# Patient Record
Sex: MALE | Race: BLACK OR AFRICAN AMERICAN
[De-identification: names, ages, dates, MRNs, and addresses within clinical notes are randomized per-mention and may not be internally consistent; named-entity substitution may affect disease eponyms.]

---

## 2020-10-10 NOTE — EDM.PDOC
ED HPI GENERAL MEDICAL PROBLEM





- General


Chief Complaint: Respiratory Problem


Stated Complaint: COVID SYMPTOMS


Time Seen by Provider: 10/10/20 18:25


Source of Information: Reports: Patient


History Limitations: Reports: No Limitations





- History of Present Illness


INITIAL COMMENTS - FREE TEXT/NARRATIVE: 





29-year-old male who reports onset of cough and some mild sore throat on 9/29 /2020. He reports that it seemed to be associated with him starting working at 

the [employer] with a lot of [employer] environment. His symptoms however continued 

even when he was at work and they have progressively worsened with time. Ears 

associated with this. No nausea or vomiting. He has had some intermittent 

headache but has no headache now. He denies any pain at present. He would rate 

his pain as a 0/10 at present. He was seen at [facility] in [place] on 

10/6/2020. He was given prescriptions for an inhaler and antibiotics and they 

were wanting to do a chest x-ray but the x-ray person darting left. The patient 

reports that he has been unable to fill the prescription because he does not 

have the funds to do that. In the interim, his feeling of shortness of breath 

and cough have worsened with time. The cough is a hacking cough and 

nonproductive. He reports that the symptoms are worse at night and he feels that

he coughs all night and he coughs to the point of 4 he also passes out. No leg 

swelling. No chest pain. No palpitations. He reports that he has been eating and

drinking normally. Nothing really seems to make his symptoms better. He does 

report that his sense of taste seems to be somewhat off today but he has normal 

sensation of smell. There are no other associated signs or symptoms. There are 

no other modifying factors.


Onset: Other (9/29/2020)


Duration: Getting Worse


Location: Reports: Other (Not applicable.)


Quality: Reports: Other (Nonapplicable.)


Improves with: Reports: Rest


Worsens with: Reports: Breathing, Other (Cough)


Context: Reports: Other


Associated Symptoms: Reports: No Other Symptoms


Treatments PTA: Reports: Other (see below)


  ** Bilateral Trunk


Pain Score (Numeric/FACES): 4





- Related Data


                                    Allergies











Allergy/AdvReac Type Severity Reaction Status Date / Time


 


No Known Allergies Allergy   Verified 10/10/20 18:04











Home Meds: 


                                    Home Meds





predniSONE [Prednisone] 60 mg PO QAM 5 Days #15 tablet 10/10/20 [Rx]











Past Medical History


Psychiatric History: Reports: Bipolar





- Past Surgical History


Other Surgical History Comment: No previous surgeries.





Social & Family History





- Tobacco Use


Smoking Status *Q: Current Every Day Smoker





- Alcohol Use


Alcohol Use History: Yes


Alcohol Use Frequency: Weekly (Drinks alcohol approximately 3 times a week.)





- Recreational Drug Use


Recreational Drug Use: No





- Living Situation & Occupation


Occupation: Employed





ED ROS GENERAL





- Review of Systems


Review Of Systems: See Below


Constitutional: Reports: Fatigue


HEENT: Reports: Throat Pain


Respiratory: Reports: Shortness of Breath, Cough.  Denies: Sputum


Cardiovascular: Reports: Lightheadedness (With coughing.)


GI/Abdominal: Reports: No Symptoms


: Reports: No Symptoms


Musculoskeletal: Reports: No Symptoms


Skin: Reports: No Symptoms


Neurological: Reports: Other (Near-syncope with coughing.)


Psychiatric: Reports: No Symptoms


Hematologic/Lymphatic: Reports: No Symptoms


Immunologic: Reports: No Symptoms





ED EXAM, GENERAL





- Physical Exam


Exam: See Below


Exam Limited By: No Limitations


General Appearance: Alert, WD/WN, No Apparent Distress


Eye Exam: Bilateral Eye: EOMI, Normal Inspection


Ears: Normal External Exam, Hearing Grossly Normal


Ear Exam: Bilateral Ear: Auricle Normal


Nose: Normal Inspection, Normal Mucosa, No Blood


Throat/Mouth: Normal Oropharynx, Normal Voice, No Airway Compromise


Head: Atraumatic, Normocephalic


Neck: Normal Inspection, Supple, Non-Tender, Full Range of Motion


Respiratory/Chest: No Respiratory Distress, No Accessory Muscle Use, Wheezing 

(Bilaterally with decreased air movement.), Prolonged Expiration.  No: Stridor


Cardiovascular: Normal Peripheral Pulses, Regular Rate, Rhythm, No Murmur, No 

Rub


Peripheral Pulses: 2+: Radial (L), Radial (R)


GI/Abdominal: Normal Bowel Sounds, Soft, Non-Tender, No Mass


Back Exam: Normal Inspection


Extremities: Normal Inspection, Normal Range of Motion, Non-Tender, No Pedal 

Edema, Normal Capillary Refill


Neurological: Alert, Oriented, CN II-XII Intact, Normal Cognition, No 

Motor/Sensory Deficits


Skin Exam: Warm, Dry, Intact, Normal Color, No Rash





Course





- Vital Signs


Last Recorded V/S: 


                                Last Vital Signs











Temp  36.8 C   10/10/20 18:05


 


Pulse  100   10/10/20 18:05


 


Resp  18   10/10/20 18:05


 


BP  136/92 H  10/10/20 18:09


 


Pulse Ox  98   10/10/20 18:05














- Orders/Labs/Meds


Orders: 


                               Active Orders 24 hr











 Category Date Time Status


 


 Chest 2V [CR] Stat Exams  10/10/20 20:50 Taken











Labs: 


                                Laboratory Tests











  10/10/20 Range/Units





  19:05 


 


SARS-CoV-2 RNA (JOE)  Negative  (NEGATIVE)  











Meds: 


Medications














Discontinued Medications














Generic Name Dose Route Start Last Admin





  Trade Name Blair  PRN Reason Stop Dose Admin


 


Albuterol  1 gm  10/10/20 20:51  10/10/20 21:15





  Ventolin Hfa  INH  10/10/20 20:52  4 inhaler





  ONETIME ONE   Administration


 


Prednisone  60 mg  10/10/20 20:51  10/10/20 21:15





  Prednisone  PO  10/10/20 20:52  60 mg





  ONETIME ONE   Administration














- Radiology Interpretation


Free Text/Narrative:: 





Chest x-ray PA and lateral shows no acute disease.





- Re-Assessments/Exams


Free Text/Narrative Re-Assessment/Exam: 





10/10/20 21:38: Patient's chest x-ray shows no acute disease. He has remained 

vitally stable with normal O2 saturations. He was given an albuterol inhaler, 4 

puffs approximately 15-20 minutes ago and he is feeling improved after this. He 

was also given prednisone 60 mg by mouth. A rapid Covid test was negative. He 

appears to be having bronchitis with reactive airway disease. It appears that 

there could be an allergic component as he has been having symptoms since he 

began working at the [employer] that he is currently working. We will send 

the inhaler home with him that he can use 2-4 puffs every 4 hours as needed for 

cough and difficulty breathing. I will also place patient on Zithromax for 5 day

 course and prednisone 60 mg daily for the next 5 days. He should increase his 

fluid intake. I have strongly encouraged him to stop smoking cigarettes. 

Precautions and reasons for return to the emergency department were discussed 

with the patient will he was in the emergency department over detailed in the 

patient's discharge instructions.








Departure





- Departure


Time of Disposition: 21:45


Disposition: Home, Self-Care 01


Condition: Good


Clinical Impression: 


 Bronchitis





Reactive airway disease


Qualifiers:


 Asthma severity: moderate Asthma persistence: persistent Asthma complication 

type: uncomplicated Qualified Code(s): J45.40 - Moderate persistent asthma, 

uncomplicated








- Discharge Information


Prescriptions: 


predniSONE [Prednisone] 60 mg PO QAM 5 Days #15 tablet


Instructions:  Bronchospasm, Adult, How to Use a Metered Dose Inhaler, Acute 

Bronchitis, Adult, Easy-to-Read


Referrals: 


Jose Alejandro Davison MD [Primary Care Provider] - 


Forms:  ED Department Discharge, ED Return to Work/School Form


Additional Instructions: 


A Covid test was negative. Your chest x-ray showed no evidence of pneumonia. You

did have quite a bit of wheezing or bronchospasm (constriction of your air 

passages) and appeared to have an asthma-like condition called reactive airway 

disease which could be related to an infection but could also be related to the 

dust in your work environment. No work for the next 2 days. Use the albuterol 

inhaler with the spacer that we provided you for wheezing and for cough and for 

difficulty breathing. Increase your fluid intake. Other medications as 

prescribed (Zithromax, prednisone). Back to the emergency department for worse 

breathing, high fever, severe weakness or any other concerning sign or symptom.





Sepsis Event Note (ED)





- Evaluation


Sepsis Screening Result: No Definite Risk





- Focused Exam


Vital Signs: 


                                   Vital Signs











  Temp Pulse Resp BP Pulse Ox


 


 10/10/20 18:09     136/92 H 


 


 10/10/20 18:05  36.8 C  100  18  157/104 H  98














- My Orders


Last 24 Hours: 


My Active Orders





10/10/20 20:50


Chest 2V [CR] Stat 














- Assessment/Plan


Last 24 Hours: 


My Active Orders





10/10/20 20:50


Chest 2V [CR] Stat

## 2021-01-18 NOTE — CR
INDICATION:  Left upper chest pain. 



CHEST, TWO VIEWS:  PA and lateral views of the chest 01/18/21 were compared with
10/10/20 and revealed the heart to remain normal in size and shape.  Mediastinum
and bony thorax were essentially unremarkable except to note a very minimal 
dextroconvex scoliosis of the mid thoracic spine.  



Overlying EKG leads are noted.  



A definite active infiltrate or effusion was not identified with markings 
similar to the previous examination.  



IMPRESSION:  No definite active disease. 



MTDD

## 2021-01-18 NOTE — EDM.PDOC
ED HPI GENERAL MEDICAL PROBLEM





- General


Chief Complaint: Chest Pain


Stated Complaint: CHEST PAIN


Time Seen by Provider: 01/18/21 18:10


Source of Information: Reports: Patient


History Limitations: Reports: No Limitations





- History of Present Illness


INITIAL COMMENTS - FREE TEXT/NARRATIVE: 





c/o L sided CP





pt has been having sharp in his L upper chest for 1m, will last several hours to

all day





no increase with DB, lifting, putting arms overhead





no decrease with heat





not taken any meds for it





says he used IV drugs in past, which has made him worried re his heart





has taken BP med x 1y





no PCP as moved to area, working, got his health card one month ago





has infant child at home





has been using an HFA for past 2m, daily, altho not clear he has been wheezing





denies h/o asthma, no exercise induced asthma


  ** left chest


Pain Score (Numeric/FACES): 6





- Related Data


                                    Allergies











Allergy/AdvReac Type Severity Reaction Status Date / Time


 


No Known Allergies Allergy   Verified 10/10/20 18:04











Home Meds: 


                                    Home Meds





FLUoxetine [PROzac] 20 mg PO DAILY 01/18/21 [History]


cloNIDine [Catapres] 0.1 mg PO BID 01/18/21 [History]











Past Medical History


Cardiovascular History: Reports: Hypertension


Neurological History: Reports: Seizure


Psychiatric History: Reports: Bipolar, PTSD





- Past Surgical History


Other Surgical History Comment: No previous surgeries.





Social & Family History





- Tobacco Use


Tobacco Use Status *Q: Current Every Day Tobacco User


Years of Tobacco use: 16


Packs/Tins Daily: 0.5





- Living Situation & Occupation


Occupation: Employed





ED ROS GENERAL





- Review of Systems


Review Of Systems: See Below


Constitutional: Reports: No Symptoms


HEENT: Reports: No Symptoms


Respiratory: Denies: Wheezing, Pleuritic Chest Pain


Cardiovascular: Reports: No Symptoms


Endocrine: Reports: No Symptoms


GI/Abdominal: Reports: No Symptoms


: Reports: No Symptoms


Musculoskeletal: Reports: No Symptoms


Skin: Reports: No Symptoms


Neurological: Reports: No Symptoms


Psychiatric: Reports: No Symptoms


Hematologic/Lymphatic: Reports: No Symptoms


Immunologic: Reports: No Symptoms





ED EXAM, GENERAL





- Physical Exam


Exam: See Below


Exam Limited By: No Limitations


General Appearance: Alert, WD/WN, No Apparent Distress, Other (holding hand on 

his L upper chest (not beter with massage), no point tender, pt wonders if it 

may be stress)


Nose: Normal Inspection


Throat/Mouth: Normal Inspection, Normal Voice


Head: Atraumatic, Normocephalic


Neck: Normal Inspection, Supple, Non-Tender, Full Range of Motion.  No: 

Lymphadenopathy (R), Lymphadenopathy (L)


Respiratory/Chest: No Respiratory Distress, Lungs Clear, Normal Breath Sounds, 

No Accessory Muscle Use, Chest Non-Tender


Cardiovascular: Regular Rate, Rhythm, No Edema, No Gallop, No JVD, No Murmur, No

 Rub


GI/Abdominal: Soft, Non-Tender


Back Exam: Normal Inspection, Full Range of Motion, NT


Extremities: Normal Inspection, Normal Range of Motion, Non-Tender, No Pedal 

Edema


Neurological: Alert, Oriented, CN II-XII Intact, Normal Cognition, Normal Gait, 

No Motor/Sensory Deficits


Psychiatric: Normal Affect, Normal Mood


Skin Exam: Warm, Dry, Intact, Normal Color, No Rash


Lymphatic: No Adenopathy





Course





- Vital Signs


Last Recorded V/S: 





                                Last Vital Signs











Temp  37.0 C   01/18/21 17:29


 


Pulse  100   01/18/21 17:29


 


Resp  19   01/18/21 17:29


 


BP  165/96 H  01/18/21 17:29


 


Pulse Ox  97   01/18/21 17:29














- Orders/Labs/Meds


Orders: 





                               Active Orders 24 hr











 Category Date Time Status


 


 Chest 2V [CR] Stat Exams  01/18/21 18:37 Ordered


 


 C-REACTIVE PROTEIN [CHEM] Stat Lab  01/18/21 18:37 Ordered


 


 CBC WITH AUTO DIFF [HEME] Stat Lab  01/18/21 18:37 Ordered


 


 COMPREHENSIVE METABOLIC PN,CMP [CHEM] Stat Lab  01/18/21 18:37 Ordered


 


 PRO B-TYPE NATRIUR PEPT,BNPPRO [CHEM] Stat Lab  01/18/21 18:37 Ordered


 


 TROPONIN I [CHEM] Stat Lab  01/18/21 18:37 Ordered














Departure





- Departure


Time of Disposition: 19:00


Disposition: Still A Patient 30


Condition: Good


Clinical Impression: 


 Left-sided chest wall pain








Sepsis Event Note (ED)





- Evaluation


Sepsis Screening Result: No Definite Risk





- Focused Exam


Vital Signs: 





                                   Vital Signs











  Temp Pulse Resp BP Pulse Ox


 


 01/18/21 17:29  37.0 C  100  19  165/96 H  97














- My Orders


Last 24 Hours: 





My Active Orders





01/18/21 18:37


Chest 2V [CR] Stat 


C-REACTIVE PROTEIN [CHEM] Stat 


CBC WITH AUTO DIFF [HEME] Stat 


COMPREHENSIVE METABOLIC PN,CMP [CHEM] Stat 


PRO B-TYPE NATRIUR PEPT,BNPPRO [CHEM] Stat 


TROPONIN I [CHEM] Stat 














- Assessment/Plan


Last 24 Hours: 





My Active Orders





01/18/21 18:37


Chest 2V [CR] Stat 


C-REACTIVE PROTEIN [CHEM] Stat 


CBC WITH AUTO DIFF [HEME] Stat 


COMPREHENSIVE METABOLIC PN,CMP [CHEM] Stat 


PRO B-TYPE NATRIUR PEPT,BNPPRO [CHEM] Stat 


TROPONIN I [CHEM] Stat

## 2021-01-23 NOTE — EDM.PDOC
ED HPI GENERAL MEDICAL PROBLEM





- General


Chief Complaint: Abdominal Pain


Stated Complaint: ABDOMINAL PAIN


Time Seen by Provider: 01/23/21 18:20


Source of Information: Reports: Patient


History Limitations: Reports: No Limitations





- History of Present Illness


INITIAL COMMENTS - FREE TEXT/NARRATIVE: 





30-year-old male who reports onset of left lower abdominal pain that is a sharp 

and throbbing type pain 3 days ago. The pain has progressively worsened with 

time and it seems to have radiated to his flank and to his left mid back and 

sometimes seems to go into his left chest. He was actually seen here 3 days ago 

for the chest pain and he reports the pain has not improved. He has also noted 

some nausea with vomiting over the past few days with vomiting times one today 

he also has had diarrhea today 3. There has been no blood in either the emesis 

or the stool but he does report that the stool has been somewhat dark. No fevers

but he has had sweats. No chills. No nasal congestion. No cough. No sore throat.

No dysuria. No hematuria. He does report decreased urine output and he has had 

decreased by mouth intake. There are no other associated signs or symptoms. 

There are no other modifying factors.


Onset: Other


Duration: Getting Worse (3 days ago)


Location: Reports: Chest, Abdomen, Back


Quality: Reports: Sharp, Throbbing


Severity: Moderate (to severe.)


Improves with: Reports: None


Worsens with: Reports: Other (Palpation. He really has not been eating and has 

no appetite.)


Context: Reports: Other (As above.)


Associated Symptoms: Reports: Loss of Appetite, Malaise, Nausea/Vomiting


Treatments PTA: Reports: Other (see below) (Nothing.)





- Related Data


                                    Allergies











Allergy/AdvReac Type Severity Reaction Status Date / Time


 


No Known Allergies Allergy   Verified 10/10/20 18:04











Home Meds: 


                                    Home Meds





FLUoxetine [PROzac] 20 mg PO DAILY 01/18/21 [History]


cloNIDine [Catapres] 0.1 mg PO BID 01/18/21 [History]











Past Medical History


Cardiovascular History: Reports: Hypertension


Respiratory History: Reports: Other (See Below) (Reactive airway disease on 

inhalers.)


Neurological History: Reports: Seizure


Psychiatric History: Reports: Bipolar, PTSD





- Past Surgical History


Other Surgical History Comment: No previous surgeries.





Social & Family History





- Tobacco Use


Tobacco Use Status *Q: Current Every Day Tobacco User





- Alcohol Use


Alcohol Use History: Yes


Alcohol Use Frequency: Weekly (3 times a week)





- Living Situation & Occupation


Occupation: Employed (Works at [employer])





ED ROS GENERAL





- Review of Systems


Review Of Systems: See Below


Constitutional: Reports: Malaise, Night Sweats, Decreased Appetite


HEENT: Reports: No Symptoms


Respiratory: Reports: No Symptoms


Cardiovascular: Reports: No Symptoms


GI/Abdominal: Reports: Abdominal Pain, Diarrhea, Nausea, Vomiting


: Reports: Other (Decreased urine output).  Denies: Dysuria, Hematuria


Musculoskeletal: Reports: No Symptoms


Skin: Reports: No Symptoms


Neurological: Reports: No Symptoms


Hematologic/Lymphatic: Reports: No Symptoms


Immunologic: Reports: No Symptoms





ED EXAM, GI/ABD





- Physical Exam


Exam: See Below


Exam Limited By: No Limitations


General Appearance: Alert, WD/WN, Mild Distress (Seems to be in some pain. He 

does not appear toxic.)


Eyes: Bilateral: Normal Appearance, EOMI


Ears: Normal External Exam, Hearing Grossly Normal


Nose: Normal Inspection, Nasal Deformity, Nasal Swelling, Nasal Drainage


Throat/Mouth: Normal Voice, No Airway Compromise, Other (Dry mucous membranes.)


Head: Atraumatic, Normocephalic


Neck: Normal Inspection, Supple, Non-Tender, Full Range of Motion


Respiratory/Chest: No Respiratory Distress, Lungs Clear, Normal Breath Sounds, 

No Accessory Muscle Use, Chest Non-Tender


Cardiovascular: Normal Peripheral Pulses, Regular Rate, Rhythm, No Murmur


GI/Abdominal Exam: Soft, No Mass, Tender (He left lower quadrant and left 

flank.), Abnormal Bowel Sounds (Bowel sounds are somewhat diminished.)


Back Exam: Full Range of Motion, CVA Tenderness (L)


Extremities: Normal Inspection, Normal Range of Motion, Non-Tender, No Pedal 

Edema, Normal Capillary Refill


Neurological: Alert, Oriented, CN II-XII Intact, Normal Cognition, No 

Motor/Sensory Deficits


Skin Exam: Warm, Dry, Intact, Normal Color, No Rash





Course





- Vital Signs


Last Recorded V/S: 


                                Last Vital Signs











Temp  36.7 C   01/23/21 17:01


 


Pulse  87   01/23/21 17:01


 


Resp  17   01/23/21 17:01


 


BP  157/109 H  01/23/21 17:01


 


Pulse Ox  98   01/23/21 17:01














- Orders/Labs/Meds


Orders: 


                               Active Orders 24 hr











 Category Date Time Status


 


 Abdomen Pelvis w Cont [CT] Stat Exams  01/23/21 19:28 Ordered


 


 Sodium Chloride 0.9% [Normal Saline] 1,000 ml Med  01/23/21 18:45 Active





 IV ASDIRECTED   


 


 Sodium Chloride 0.9% [Saline Flush] Med  01/23/21 18:30 Active





 10 ml FLUSH ASDIRECTED PRN   


 


 Peripheral IV Insertion Adult [OM.PC] Routine Oth  01/23/21 18:30 Ordered








                                Medication Orders





Sodium Chloride (Normal Saline)  1,000 mls @ 150 mls/hr IV ASDIRECTED SALVADOR


   Last Admin: 01/23/21 19:52  Dose: 150 mls/hr


   Documented by: MATIMAR


Sodium Chloride (Saline Flush)  10 ml FLUSH ASDIRECTED PRN


   PRN Reason: Keep Vein Open








Labs: 


                                Laboratory Tests











  01/23/21 01/23/21 01/23/21 Range/Units





  18:30 18:40 18:40 


 


WBC   6.8   (3.2-10.1)  x10-3/uL


 


RBC   4.96   (3.90-5.90)  x10(6)uL


 


Hgb   15.4   (12.9-17.7)  g/dL


 


Hct   45.4   (38.3-50.1)  %


 


MCV   91.6   (80.8-98.7)  fL


 


MCH   31.0   (27.0-33.3)  pg


 


MCHC   33.8   (28.7-35.3)  g/dL


 


RDW   13.7   (12.4-15.0)  %


 


Plt Count   234   (117-477)  x10(3)uL


 


MPV   9.6   (6.7-11.0)  fL


 


Neut % (Auto)   55.3   (40.3-71.8)  %


 


Lymph % (Auto)   27.5   (15.8-45.3)  %


 


Mono % (Auto)   14.0   (5.5-15.2)  %


 


Eos % (Auto)   2.5   (0.1-6.8)  %


 


Baso % (Auto)   0.7   (0.3-3.8)  %


 


Neut # (Auto)   3.7   (1.7-6.9)  x10-3/uL


 


Lymph # (Auto)   1.9   (0.5-4.5)  x10-3/uL


 


Mono # (Auto)   0.9   (0.0-1.2)  x10-3/uL


 


Eos # (Auto)   0.2   (0.0-0.6)  x10-3/uL


 


Baso # (Auto)   0.0   (0.0-0.3)  x10-3/uL


 


Sodium    138  (135-145)  mmol/L


 


Potassium    4.0  (3.5-5.3)  mmol/L


 


Chloride    100  (100-110)  mmol/L


 


Carbon Dioxide    27  (21-32)  mmol/L


 


BUN    7  (7-18)  mg/dL


 


Creatinine    1.1  (0.70-1.30)  mg/dL


 


Est Cr Clr Drug Dosing    TNP  


 


Estimated GFR (MDRD)    > 60  (>60)  


 


BUN/Creatinine Ratio    6.4 L  (9-20)  


 


Glucose    105  ()  mg/dL


 


Calcium    8.4 L  (8.6-10.2)  mg/dL


 


Magnesium    1.4 L  (1.8-2.5)  mg/dL


 


Total Bilirubin    0.7  (0.1-1.3)  mg/dL


 


AST    100 H D  (5-25)  IU/L


 


ALT    164 H* D  (12-36)  U/L


 


Alkaline Phosphatase    116 H  ()  IU/L


 


C-Reactive Protein     (0.5-0.9)  mg/dL


 


Total Protein    8.2 H  (6.0-8.0)  g/dL


 


Albumin    3.9  (3.5-5.2)  g/dL


 


Globulin    4.3  g/dL


 


Albumin/Globulin Ratio    0.9  


 


Lipase     ()  U/L


 


Urine Color  Yellow    (YELLOW)  


 


Urine Appearance  Slightly cloudy    (CLEAR)  


 


Urine pH  6.0    (5.0-6.5)  


 


Ur Specific Gravity  1.020    (1.010-1.025)  


 


Urine Protein  30 H    (NEGATIVE)  mg/dL


 


Urine Glucose (UA)  Normal    (NORMAL)  mg/dL


 


Urine Ketones  15 H    (NEGATIVE)  mg/dL


 


Urine Occult Blood  Negative    (NEGATIVE)  


 


Urine Nitrite  Negative    (NEGATIVE)  


 


Urine Bilirubin  Small H    (NEGATIVE)  


 


Urine Urobilinogen  Normal    (NEGATIVE)  mg/dL


 


Ur Leukocyte Esterase  Negative    (NEGATIVE)  


 


Urine RBC  0-5    (0-5)  


 


Urine WBC  0-5    (0-5)  


 


Ur Squamous Epith Cells  Occasional    (NS,R,O)  


 


Urine Bacteria  Few H    (NS)  


 


Fine Granular Casts  Occasional H    (NS)  


 


Urine Mucus  Many H    (NS)  














  01/23/21 Range/Units





  18:40 


 


WBC   (3.2-10.1)  x10-3/uL


 


RBC   (3.90-5.90)  x10(6)uL


 


Hgb   (12.9-17.7)  g/dL


 


Hct   (38.3-50.1)  %


 


MCV   (80.8-98.7)  fL


 


MCH   (27.0-33.3)  pg


 


MCHC   (28.7-35.3)  g/dL


 


RDW   (12.4-15.0)  %


 


Plt Count   (117-477)  x10(3)uL


 


MPV   (6.7-11.0)  fL


 


Neut % (Auto)   (40.3-71.8)  %


 


Lymph % (Auto)   (15.8-45.3)  %


 


Mono % (Auto)   (5.5-15.2)  %


 


Eos % (Auto)   (0.1-6.8)  %


 


Baso % (Auto)   (0.3-3.8)  %


 


Neut # (Auto)   (1.7-6.9)  x10-3/uL


 


Lymph # (Auto)   (0.5-4.5)  x10-3/uL


 


Mono # (Auto)   (0.0-1.2)  x10-3/uL


 


Eos # (Auto)   (0.0-0.6)  x10-3/uL


 


Baso # (Auto)   (0.0-0.3)  x10-3/uL


 


Sodium   (135-145)  mmol/L


 


Potassium   (3.5-5.3)  mmol/L


 


Chloride   (100-110)  mmol/L


 


Carbon Dioxide   (21-32)  mmol/L


 


BUN   (7-18)  mg/dL


 


Creatinine   (0.70-1.30)  mg/dL


 


Est Cr Clr Drug Dosing   


 


Estimated GFR (MDRD)   (>60)  


 


BUN/Creatinine Ratio   (9-20)  


 


Glucose   ()  mg/dL


 


Calcium   (8.6-10.2)  mg/dL


 


Magnesium   (1.8-2.5)  mg/dL


 


Total Bilirubin   (0.1-1.3)  mg/dL


 


AST   (5-25)  IU/L


 


ALT   (12-36)  U/L


 


Alkaline Phosphatase   ()  IU/L


 


C-Reactive Protein  0.3 L  (0.5-0.9)  mg/dL


 


Total Protein   (6.0-8.0)  g/dL


 


Albumin   (3.5-5.2)  g/dL


 


Globulin   g/dL


 


Albumin/Globulin Ratio   


 


Lipase  105  ()  U/L


 


Urine Color   (YELLOW)  


 


Urine Appearance   (CLEAR)  


 


Urine pH   (5.0-6.5)  


 


Ur Specific Gravity   (1.010-1.025)  


 


Urine Protein   (NEGATIVE)  mg/dL


 


Urine Glucose (UA)   (NORMAL)  mg/dL


 


Urine Ketones   (NEGATIVE)  mg/dL


 


Urine Occult Blood   (NEGATIVE)  


 


Urine Nitrite   (NEGATIVE)  


 


Urine Bilirubin   (NEGATIVE)  


 


Urine Urobilinogen   (NEGATIVE)  mg/dL


 


Ur Leukocyte Esterase   (NEGATIVE)  


 


Urine RBC   (0-5)  


 


Urine WBC   (0-5)  


 


Ur Squamous Epith Cells   (NS,R,O)  


 


Urine Bacteria   (NS)  


 


Fine Granular Casts   (NS)  


 


Urine Mucus   (NS)  











Meds: 


Medications











Generic Name Dose Route Start Last Admin





  Trade Name Freq  PRN Reason Stop Dose Admin


 


Sodium Chloride  1,000 mls @ 150 mls/hr  01/23/21 18:45  01/23/21 19:52





  Normal Saline  IV   150 mls/hr





  ASDIRECTED SALVADOR   Administration


 


Sodium Chloride  10 ml  01/23/21 18:30 





  Saline Flush  FLUSH  





  ASDIRECTED PRN  





  Keep Vein Open  














Discontinued Medications














Generic Name Dose Route Start Last Admin





  Trade Name Freq  PRN Reason Stop Dose Admin


 


Sodium Chloride  1,000 mls @ 999 mls/hr  01/23/21 18:32  01/23/21 18:35





  Normal Saline  IV  01/23/21 19:32  999 mls/hr





  .BOLUS ONE   Administration


 


Iopamidol  100 ml  01/23/21 19:37  01/23/21 19:51





  Isovue-370 (76%)  IV  01/23/21 19:38  100 ml





  .AS DIRECTED ONE   Administration


 


Ketorolac Tromethamine  30 mg  01/23/21 18:32  01/23/21 18:49





  Toradol  IVPUSH  01/23/21 18:33  30 mg





  ONETIME ONE   Administration


 


Ondansetron HCl  4 mg  01/23/21 18:32  01/23/21 18:51





  Zofran  IVPUSH  01/23/21 18:33  4 mg





  ONETIME ONE   Administration














- Re-Assessments/Exams


Free Text/Narrative Re-Assessment/Exam: 





01/23/21 19:30: Care of patient to Dr. Alves. Please see his note regard to 

the patient's further ED course and to the patient's disposition.








Departure





- Departure


Time of Disposition: 19:30


Disposition: Still A Patient 30


Clinical Impression: 


 Vomiting and diarrhea





Abdominal pain


Qualifiers:


 Abdominal location: left lower quadrant Qualified Code(s): R10.32 - Left lower 

quadrant pain








- Discharge Information


Referrals: 


PCP,None [Primary Care Provider] - 


Forms:  ED Department Discharge





Sepsis Event Note (ED)





- Focused Exam


Vital Signs: 


                                   Vital Signs











  Temp Pulse Resp BP Pulse Ox


 


 01/23/21 17:01  36.7 C  87  17  157/109 H  98














- My Orders


Last 24 Hours: 


My Active Orders





01/23/21 18:30


Sodium Chloride 0.9% [Saline Flush]   10 ml FLUSH ASDIRECTED PRN 


Peripheral IV Insertion Adult [OM.PC] Routine 





01/23/21 18:45


Sodium Chloride 0.9% [Normal Saline] 1,000 ml IV ASDIRECTED 














- Assessment/Plan


Last 24 Hours: 


My Active Orders





01/23/21 18:30


Sodium Chloride 0.9% [Saline Flush]   10 ml FLUSH ASDIRECTED PRN 


Peripheral IV Insertion Adult [OM.PC] Routine 





01/23/21 18:45


Sodium Chloride 0.9% [Normal Saline] 1,000 ml IV ASDIRECTED

## 2021-05-27 ENCOUNTER — HOSPITAL ENCOUNTER (INPATIENT)
Dept: HOSPITAL 7 - FB.ED | Age: 31
LOS: 1 days | Discharge: NURSING, REHAB OR LONG-TERM CARE | DRG: 896 | End: 2021-05-28
Attending: FAMILY MEDICINE | Admitting: FAMILY MEDICINE
Payer: MEDICAID

## 2021-05-27 DIAGNOSIS — K21.9: ICD-10-CM

## 2021-05-27 DIAGNOSIS — E66.9: ICD-10-CM

## 2021-05-27 DIAGNOSIS — K76.0: ICD-10-CM

## 2021-05-27 DIAGNOSIS — F19.11: ICD-10-CM

## 2021-05-27 DIAGNOSIS — R10.32: ICD-10-CM

## 2021-05-27 DIAGNOSIS — R18.8: ICD-10-CM

## 2021-05-27 DIAGNOSIS — R56.9: ICD-10-CM

## 2021-05-27 DIAGNOSIS — F20.9: ICD-10-CM

## 2021-05-27 DIAGNOSIS — N17.9: ICD-10-CM

## 2021-05-27 DIAGNOSIS — M54.5: ICD-10-CM

## 2021-05-27 DIAGNOSIS — F10.232: Primary | ICD-10-CM

## 2021-05-27 DIAGNOSIS — F17.200: ICD-10-CM

## 2021-05-27 DIAGNOSIS — K85.90: ICD-10-CM

## 2021-05-27 DIAGNOSIS — Z79.899: ICD-10-CM
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
## 2021-05-27 DIAGNOSIS — Z20.822: ICD-10-CM

## 2021-05-27 DIAGNOSIS — I10: ICD-10-CM

## 2021-05-27 DIAGNOSIS — F43.10: ICD-10-CM

## 2021-05-27 DIAGNOSIS — F31.9: ICD-10-CM

## 2021-05-27 PROCEDURE — C9113 INJ PANTOPRAZOLE SODIUM, VIA: HCPCS

## 2021-05-27 PROCEDURE — U0002 COVID-19 LAB TEST NON-CDC: HCPCS

## 2021-05-27 RX ADMIN — LORAZEPAM SCH MG: 2 INJECTION INTRAMUSCULAR; INTRAVENOUS at 20:59

## 2021-05-27 RX ADMIN — LORAZEPAM SCH MG: 2 INJECTION INTRAMUSCULAR; INTRAVENOUS at 18:44

## 2021-05-27 RX ADMIN — INSULIN LISPRO SCH: 100 INJECTION, SOLUTION INTRAVENOUS; SUBCUTANEOUS at 15:37

## 2021-05-27 RX ADMIN — HYDROMORPHONE HYDROCHLORIDE PRN MG: 2 INJECTION INTRAMUSCULAR; INTRAVENOUS; SUBCUTANEOUS at 17:32

## 2021-05-27 RX ADMIN — HYDROMORPHONE HYDROCHLORIDE PRN MG: 2 INJECTION INTRAMUSCULAR; INTRAVENOUS; SUBCUTANEOUS at 19:45

## 2021-05-27 RX ADMIN — Medication PRN ML: at 21:20

## 2021-05-27 RX ADMIN — INSULIN LISPRO SCH UNIT: 100 INJECTION, SOLUTION INTRAVENOUS; SUBCUTANEOUS at 23:35

## 2021-05-27 RX ADMIN — INSULIN LISPRO SCH: 100 INJECTION, SOLUTION INTRAVENOUS; SUBCUTANEOUS at 18:47

## 2021-05-27 RX ADMIN — INSULIN LISPRO SCH: 100 INJECTION, SOLUTION INTRAVENOUS; SUBCUTANEOUS at 23:14

## 2021-05-27 RX ADMIN — INSULIN LISPRO SCH UNIT: 100 INJECTION, SOLUTION INTRAVENOUS; SUBCUTANEOUS at 17:39

## 2021-05-27 RX ADMIN — KETOROLAC TROMETHAMINE SCH MG: 30 INJECTION, SOLUTION INTRAMUSCULAR at 22:46

## 2021-05-27 NOTE — PCM.HP.2
H&P History of Present Illness





- General


Date of Service: 05/27/21


Admit Problem/Dx: 


                           Admission Diagnosis/Problem





Admission Diagnosis/Problem      Pancreatitis








Source of Information: Patient, Old Records, Provider


History Limitations: Reports: No Limitations





- History of Present Illness


Initial Comments - Free Text/Narative: 


30-year-old gentleman with a past medical history significant for alcohol and 

other substance abuse came to the emergency department for evaluation of acute 

abdominal pain.  He has been to rehabilitation and has not used any substances 

other than alcohol for several years.  However, he does drink alcohol, 6 or more

1 ounce servings of liquor daily.  His last alcohol use was approximately 24 

hours prior to presenting to the emergency department and the patient states he 

had about 5 shots.  He had been having increasing abdominal pain, nausea, and 

reduced appetite for several days.  In the emergency department he was found to 

have acute pancreatitis with elevated liver enzymes and elevated lipase.  CT of 

the abdomen showed likely pancreatitis with likely pseudocyst/phlegmon.  Patient

will be admitted for treatment of acute pancreatitis.





Onset of Symptoms: Reports: Gradual


Duration of Symptoms: Reports: Day(s):


Location: Reports: Abdomen


  ** ABDOMINAL


Pain Score (Numeric/FACES): 10





- Related Data


Allergies/Adverse Reactions: 


                                    Allergies











Allergy/AdvReac Type Severity Reaction Status Date / Time


 


No Known Allergies Allergy   Unverified 05/27/21 11:57











Home Medications: 


                                    Home Meds





cloNIDine [Catapres] 0.05 mg PO DAILY 01/18/21 [History]


Albuterol Sulfate [Albuterol Sulfate Hfa] 2 puff INH Q4H PRN 05/27/21 [History]


Losartan [Cozaar] 50 mg PO DAILY 05/27/21 [History]


Omeprazole 20 mg PO DAILY 05/27/21 [History]











Past Medical History


Cardiovascular History: Reports: Hypertension


Respiratory History: Reports: Other (See Below) (Reactive airway disease on 

inhalers.)


Neurological History: Reports: Seizure


Psychiatric History: Reports: Bipolar, PTSD, Schizophrenia, Suicide Attempt





- Past Surgical History


Other Surgical History Comment: No previous surgeries.





Social & Family History





- Family History


Family Medical History: No Pertinent Family History





- Tobacco Use


Tobacco Use Status *Q: Current Every Day Tobacco User


Years of Tobacco use: 20


Packs/Tins Daily: 0





- Caffeine Use


Caffeine Use: Reports: Coffee





- Alcohol Use


Days Per Week of Alcohol Use: 7


Number of Drinks Per Day: 7


Total Drinks Per Week: 49





- Recreational Drug Use


Recreational Drug Use: No





- Living Situation & Occupation


Occupation: Employed (Works at [employer])





H&P Review of Systems





- Review of Systems:


Review Of Systems: See Below


General: Reports: Decreased Appetite


Pulmonary: Reports: No Symptoms


Cardiovascular: Reports: No Symptoms


Gastrointestinal: Reports: Abdominal Pain, Nausea


Genitourinary: Reports: No Symptoms


Musculoskeletal: Reports: Back Pain


Skin: Reports: No Symptoms


Psychiatric: Reports: No Symptoms


Hematologic/Lymphatic: Reports: No Symptoms


Immunologic: Reports: No Symptoms





Exam





- Exam


Exam: See Below





- Vital Signs


Vital Signs: 


                                Last Vital Signs











Temp  36.4 C   05/27/21 11:35


 


Pulse  110 H  05/27/21 14:31


 


Resp  18   05/27/21 14:31


 


BP  170/86 H  05/27/21 15:26


 


Pulse Ox  98   05/27/21 14:31











Weight: 106.594 kg





- Exam


Quality Assessment: Supplemental Oxygen, DVT Prophylaxis


General: Alert, Oriented, Cooperative


HEENT: EOMI


Lungs: Clear to Auscultation, Normal Respiratory Effort


Cardiovascular: Regular Rate, Regular Rhythm


GI/Abdominal Exam: Normal Bowel Sounds, Tender


Back Exam: Paraspinal Tenderness


Extremities: Normal Inspection


Peripheral Pulses: 2+: Radial (L), Radial (R), Dorsalis Pedis (L), Dorsalis 

Pedis (R)


Skin: Warm, Dry


Neurological: Cranial Nerves Intact


Neuro Extensive - Mental Status: Alert, Oriented x3, Normal Mood/Affect


Psychiatric: Alert, Normal Affect, Normal Mood





- Patient Data


Lab Results Last 24 hrs: 


                         Laboratory Results - last 24 hr











  05/27/21 05/27/21 05/27/21 Range/Units





  12:12 12:12 12:12 


 


WBC  8.2    (3.2-10.1)  x10-3/uL


 


RBC  4.94    (3.90-5.90)  x10(6)uL


 


Hgb  16.0    (12.9-17.7)  g/dL


 


Hct  47.4    (38.3-50.1)  %


 


MCV  96.0    (80.8-98.7)  fL


 


MCH  32.5    (27.0-33.3)  pg


 


MCHC  33.8    (28.7-35.3)  g/dL


 


RDW  13.6    (12.4-15.0)  %


 


Plt Count  194    (117-477)  x10(3)uL


 


MPV  9.8    (6.7-11.0)  fL


 


Neut % (Auto)  63.8    (40.3-71.8)  %


 


Lymph % (Auto)  23.6    (15.8-45.3)  %


 


Mono % (Auto)  11.7    (5.5-15.2)  %


 


Eos % (Auto)  0.5    (0.1-6.8)  %


 


Baso % (Auto)  0.4    (0.3-3.8)  %


 


Neut # (Auto)  5.3    (1.7-6.9)  x10-3/uL


 


Lymph # (Auto)  1.9    (0.5-4.5)  x10-3/uL


 


Mono # (Auto)  1.0    (0.0-1.2)  x10-3/uL


 


Eos # (Auto)  0.0    (0.0-0.6)  x10-3/uL


 


Baso # (Auto)  0.0    (0.0-0.3)  x10-3/uL


 


Sodium   136   (135-145)  mmol/L


 


Potassium   3.5   (3.5-5.3)  mmol/L


 


Chloride   98 L   (100-110)  mmol/L


 


Carbon Dioxide   25   (21-32)  mmol/L


 


BUN   7   (7-18)  mg/dL


 


Creatinine   1.2   (0.70-1.30)  mg/dL


 


Est Cr Clr Drug Dosing   86.29   mL/min


 


Estimated GFR (MDRD)   > 60   (>60)  


 


BUN/Creatinine Ratio   5.8 L   (9-20)  


 


Glucose   187 H D   ()  mg/dL


 


Calcium   8.0 L   (8.6-10.2)  mg/dL


 


Magnesium     (1.8-2.5)  mg/dL


 


Total Bilirubin   0.8   (0.1-1.3)  mg/dL


 


AST   247 H* D   (5-25)  IU/L


 


ALT   195 H* D   (12-36)  U/L


 


Alkaline Phosphatase   130 H   ()  IU/L


 


C-Reactive Protein    0.8  (0.5-0.9)  mg/dL


 


Total Protein   8.3 H   (6.0-8.0)  g/dL


 


Albumin   3.6   (3.5-5.2)  g/dL


 


Globulin   4.7   g/dL


 


Albumin/Globulin Ratio   0.8   


 


Lipase    477 H  ()  U/L


 


Urine Color     (YELLOW)  


 


Urine Appearance     (CLEAR)  


 


Urine pH     (5.0-6.5)  


 


Ur Specific Gravity     (1.010-1.025)  


 


Urine Protein     (NEGATIVE)  mg/dL


 


Urine Glucose (UA)     (NORMAL)  mg/dL


 


Urine Ketones     (NEGATIVE)  mg/dL


 


Urine Occult Blood     (NEGATIVE)  


 


Urine Nitrite     (NEGATIVE)  


 


Urine Bilirubin     (NEGATIVE)  


 


Urine Urobilinogen     (NEGATIVE)  mg/dL


 


Ur Leukocyte Esterase     (NEGATIVE)  


 


Urine RBC     (0-5)  


 


Urine WBC     (0-5)  


 


Ur Squamous Epith Cells     (NS,R,O)  


 


Urine Bacteria     (NS)  


 


Urine Mucus     (NS)  


 


Ethyl Alcohol    0.13 H  (<0.03)  %


 


SARS-CoV-2 RNA (JOE)     (NEGATIVE)  














  05/27/21 05/27/21 05/27/21 Range/Units





  12:12 13:20 13:55 


 


WBC     (3.2-10.1)  x10-3/uL


 


RBC     (3.90-5.90)  x10(6)uL


 


Hgb     (12.9-17.7)  g/dL


 


Hct     (38.3-50.1)  %


 


MCV     (80.8-98.7)  fL


 


MCH     (27.0-33.3)  pg


 


MCHC     (28.7-35.3)  g/dL


 


RDW     (12.4-15.0)  %


 


Plt Count     (117-477)  x10(3)uL


 


MPV     (6.7-11.0)  fL


 


Neut % (Auto)     (40.3-71.8)  %


 


Lymph % (Auto)     (15.8-45.3)  %


 


Mono % (Auto)     (5.5-15.2)  %


 


Eos % (Auto)     (0.1-6.8)  %


 


Baso % (Auto)     (0.3-3.8)  %


 


Neut # (Auto)     (1.7-6.9)  x10-3/uL


 


Lymph # (Auto)     (0.5-4.5)  x10-3/uL


 


Mono # (Auto)     (0.0-1.2)  x10-3/uL


 


Eos # (Auto)     (0.0-0.6)  x10-3/uL


 


Baso # (Auto)     (0.0-0.3)  x10-3/uL


 


Sodium     (135-145)  mmol/L


 


Potassium     (3.5-5.3)  mmol/L


 


Chloride     (100-110)  mmol/L


 


Carbon Dioxide     (21-32)  mmol/L


 


BUN     (7-18)  mg/dL


 


Creatinine     (0.70-1.30)  mg/dL


 


Est Cr Clr Drug Dosing     mL/min


 


Estimated GFR (MDRD)     (>60)  


 


BUN/Creatinine Ratio     (9-20)  


 


Glucose     ()  mg/dL


 


Calcium     (8.6-10.2)  mg/dL


 


Magnesium  1.6 L    (1.8-2.5)  mg/dL


 


Total Bilirubin     (0.1-1.3)  mg/dL


 


AST     (5-25)  IU/L


 


ALT     (12-36)  U/L


 


Alkaline Phosphatase     ()  IU/L


 


C-Reactive Protein     (0.5-0.9)  mg/dL


 


Total Protein     (6.0-8.0)  g/dL


 


Albumin     (3.5-5.2)  g/dL


 


Globulin     g/dL


 


Albumin/Globulin Ratio     


 


Lipase     ()  U/L


 


Urine Color   Orange   (YELLOW)  


 


Urine Appearance   Clear   (CLEAR)  


 


Urine pH   5.0   (5.0-6.5)  


 


Ur Specific Gravity   1.010   (1.010-1.025)  


 


Urine Protein   Negative   (NEGATIVE)  mg/dL


 


Urine Glucose (UA)   Normal   (NORMAL)  mg/dL


 


Urine Ketones   Negative   (NEGATIVE)  mg/dL


 


Urine Occult Blood   Negative   (NEGATIVE)  


 


Urine Nitrite   Negative   (NEGATIVE)  


 


Urine Bilirubin   Negative   (NEGATIVE)  


 


Urine Urobilinogen   Normal   (NEGATIVE)  mg/dL


 


Ur Leukocyte Esterase   Negative   (NEGATIVE)  


 


Urine RBC   0-5   (0-5)  


 


Urine WBC   5-10 H   (0-5)  


 


Ur Squamous Epith Cells   Occasional   (NS,R,O)  


 


Urine Bacteria   Rare H   (NS)  


 


Urine Mucus   Moderate H   (NS)  


 


Ethyl Alcohol     (<0.03)  %


 


SARS-CoV-2 RNA (JOE)    Negative  (NEGATIVE)  











Result Diagrams: 


                                 05/27/21 12:12





                                 05/27/21 16:20





Sepsis Event Note





- Evaluation


Sepsis Screening Result: No Definite Risk





- Focused Exam


Vital Signs: 


                                   Vital Signs











  Temp Pulse Resp BP BP Pulse Ox


 


 05/27/21 15:26     170/86 H  


 


 05/27/21 14:31   110 H  18   176/96 H  98


 


 05/27/21 11:35  36.4 C  113 H  22 H   148/98 H  96














- Problem List


(1) Hypertension


SNOMED Code(s): 84771225


   ICD Code: I10 - ESSENTIAL (PRIMARY) HYPERTENSION   Status: Chronic   Current 

Visit: Yes   





(2) GERD (gastroesophageal reflux disease)


SNOMED Code(s): 894198467


   ICD Code: K21.9 - GASTRO-ESOPHAGEAL REFLUX DISEASE WITHOUT ESOPHAGITIS   

Status: Chronic   Current Visit: Yes   





(3) Obesity


SNOMED Code(s): 299915217, 837550366


   ICD Code: E66.9 - OBESITY, UNSPECIFIED   Status: Chronic   Current Visit: Yes

   





(4) Acute pancreatitis


SNOMED Code(s): 254261238


   ICD Code: K85.90 - ACUTE PANCREATITIS WITHOUT NECROSIS OR INFECTION, UNSP   

Status: Acute   Current Visit: Yes   





(5) Alcohol abuse


SNOMED Code(s): 85815013


   ICD Code: F10.10 - ALCOHOL ABUSE, UNCOMPLICATED   Status: Acute   Current 

Visit: Yes   





(6) Elevated total protein


SNOMED Code(s): 967873536, 980296678


   ICD Code: R77.8 - OTHER SPECIFIED ABNORMALITIES OF PLASMA PROTEINS   Status: 

Acute   Current Visit: Yes   





(7) Fatty liver


SNOMED Code(s): 376252006


   ICD Code: K76.0 - FATTY (CHANGE OF) LIVER, NOT ELSEWHERE CLASSIFIED   Status:

 Acute   Current Visit: Yes   





(8) History of intravenous drug abuse


SNOMED Code(s): 83159164418016419


   ICD Code: F19.11 - OTHER PSYCHOACTIVE SUBSTANCE ABUSE, IN REMISSION   Status:

 Chronic   Current Visit: Yes   





(9) Hyperglycemia


SNOMED Code(s): 15926563


   ICD Code: R73.9 - HYPERGLYCEMIA, UNSPECIFIED   Status: Acute   Current Visit:

 Yes   





(10) Phlegmon of pancreas


SNOMED Code(s): 16138468


   ICD Code: K85.90 - ACUTE PANCREATITIS WITHOUT NECROSIS OR INFECTION, UNSP   

Status: Acute   Current Visit: Yes   





(11) Abdominal pain


SNOMED Code(s): 89244595


   ICD Code: R10.9 - UNSPECIFIED ABDOMINAL PAIN   Status: Acute   Current Visit:

 No   


Qualifiers: 


   Abdominal location: left lower quadrant   Qualified Code(s): R10.32 - Left 

lower quadrant pain   





(12) Low back pain


SNOMED Code(s): 212480677


   ICD Code: M54.5 - LOW BACK PAIN   Status: Acute   Current Visit: Yes   


Problem List Initiated/Reviewed/Updated: Yes


Orders Last 24hrs: 


                               Active Orders 24 hr











 Category Date Time Status


 


 Admission Status [Patient Status] [ADT] Routine ADT  05/27/21 13:50 Active


 


 Accu Check [Blood Glucose Check, Bedside] [] Q2H Care  05/27/21 14:54 Active


 


 Activity as Tolerated [RC] .Routine Care  05/27/21 15:45 Ordered


 


 CIWAA Assessment [RC] Q4H Care  05/27/21 14:55 Active


 


 Notify Provider [RC] PRN Care  05/27/21 14:56 Active


 


 RT Post Treatment Assessment [RC] Click to Edit Care  05/27/21 14:41 Active


 


 Supplemental O2 [Oxygen Therapy] [RC] ASDIRECTED Care  05/27/21 15:40 Ordered


 


 Nothing Per Oral Diet [DIET] Diet  05/27/21 Dinner Active


 


 Abdomen Pelvis w Cont [CT] Stat Exams  05/27/21 12:00 Taken


 


 COMPREHENSIVE METABOLIC PN,CMP [CHEM] Q8H Lab  05/27/21 16:00 Ordered


 


 COMPREHENSIVE METABOLIC PN,CMP [CHEM] Q8H Lab  05/28/21 00:00 Ordered


 


 COMPREHENSIVE METABOLIC PN,CMP [CHEM] Q8H Lab  05/28/21 08:00 Ordered


 


 COMPREHENSIVE METABOLIC PN,CMP [CHEM] Q8H Lab  05/28/21 16:00 Ordered


 


 MAGNESIUM [CHEM] Routine Lab  05/27/21 23:59 Ordered


 


 MAGNESIUM [CHEM] Routine Lab  05/28/21 08:00 Ordered


 


 Albuterol [Ventolin HFA] Med  05/27/21 14:40 Active





 0 gm INH Q4H PRN   


 


 Dextrose 50% in Water Med  05/27/21 14:53 Active





 50 ml IVPUSH ASDIRECTED PRN   


 


 Enoxaparin [Lovenox] Med  05/27/21 15:45 Ordered





 40 mg SUBCUT DAILY   


 


 Folic Acid/Vitamin B Comp W-C [Renal Caps Softgel] Med  05/28/21 09:00 Active





 1 cap PO DAILY   


 


 Glucagon,Human Recombinant [GlucaGen] Med  05/27/21 14:53 Active





 1 mg IM ASDIRECTED PRN   


 


 HYDROmorphone [Dilaudid] Med  05/27/21 14:45 Active





 2 mg IVPUSH Q3H PRN   


 


 Insulin Lispro [HumaLOG] Med  05/27/21 15:00 Active





 See Protocol  SUBCUT Q2H   


 


 LORazepam [Ativan] Med  05/27/21 15:00 Active





 See Protocol  IV ASDIRECTED   


 


 LORazepam [Ativan] Med  05/27/21 15:00 Active





 See Protocol  PO ASDIRECTED   


 


 Labetalol [Normodyne] Med  05/27/21 15:20 Active





 10 mg IV Q4H PRN   


 


 Losartan [Cozaar] Med  05/28/21 09:00 Active





 50 mg PO DAILY   


 


 MVI, Adult with Vitamin K [Infuvite Adult] 10 ml Med  05/27/21 15:00 Active





 Thiamine [Vitamin B-1] 100 mg   





 Folic Acid 1 mg   





 Magnesium Sulfate [Magnesium Sulfate 50%] 3 gm   





 Sodium Chloride 0.9% [Normal Saline] 1,000 ml   





 IV ONETIME   


 


 Pantoprazole [ProTONIX IV***] Med  05/27/21 15:45 Ordered





 40 mg IVPUSH Q24H   


 


 Sodium Chloride 0.9% [Normal Saline] 1,000 ml Med  05/27/21 12:00 Active





 IV ASDIRECTED   


 


 Sodium Chloride 0.9% [Normal Saline] 1,000 ml Med  05/27/21 18:45 Active





 IV ASDIRECTED   


 


 Thiamine [Vitamin B-1] Med  05/28/21 09:00 Active





 100 mg PO DAILY   


 


 cloNIDine [Catapres] Med  05/27/21 15:00 Active





 0.05 mg PO DAILY   








                                Medication Orders





Albuterol (Albuterol 8 Gm Inhaler)  0 gm INH Q4H PRN


   PRN Reason: Dyspnea


Clonidine HCl (Clonidine 0.1 Mg Tab)  0.05 mg PO DAILY [facility]


   Last Admin: 05/27/21 15:26  Dose: 0.05 mg


   Documented by: YONI


Dextrose/Water (50% Dextrose In Water 50 Ml Syringe)  50 ml IVPUSH ASDIRECTED 

PRN


   PRN Reason: Hypoglycemia


Enoxaparin Sodium (Enoxaparin 40 Mg/0.4 Ml Syringe)  40 mg SUBCUT DAILY@1600 [facility]


Glucagon (Glucagon,Human Recombinant 1 Mg Vial)  1 mg IM ASDIRECTED PRN


   PRN Reason: Hypoglycemia


Hydromorphone HCl (Hydromorphone 2 Mg/Ml Sdv)  2 mg IVPUSH Q3H PRN


   PRN Reason: Pain (severe 7-10)


   Last Admin: 05/27/21 15:07  Dose: 2 mg


   Documented by: YONI


Sodium Chloride (Normal Saline)  1,000 mls @ 999 mls/hr IV ASDIRECTED [facility]


   Last Infusion: 05/27/21 12:55  Dose: 500 mls/hr


   Documented by: RYAN


   Admin: 05/27/21 12:15  Dose: 999 mls/hr


   Documented by: RYAN


Multivitamins/Minerals 10 ml/Thiamine HCl 100 mg/ Folic Acid 1 mg/ Magnesium 

Sulfate 3 gm/ Sodium Chloride  1,017.2 mls @ 250 mls/hr IV ONETIME ONE


   Stop: 05/27/21 19:04


   Last Admin: 05/27/21 15:24  Dose: 250 mls/hr


   Documented by: YONI


Sodium Chloride (Normal Saline)  1,000 mls @ 150 mls/hr IV ASDIRECTED [facility]


Insulin Human Lispro (Insulin Lispro 100 Unit/Ml 3 Ml Kwikpen)  0 unit SUBCUT 

Q2H [facility]; Protocol


   Last Admin: 05/27/21 15:37 Dose:  Not Given


   Documented by: YONI


Labetalol HCl (Labetalol 20 Mg/4 Ml Syringe)  10 mg IV Q4H PRN; Protocol


   PRN Reason: Hypertension


Lorazepam (Lorazepam 2 Mg/Ml Sdv)  0 mg IV ASDIRECTED [facility]; Protocol


Lorazepam (Lorazepam 1 Mg Tab)  0 mg PO ASDIRECTED [facility]; Protocol


Losartan Potassium (Losartan 50 Mg Tab)  50 mg PO DAILY [facility]


Multivit/Ca Carb/B Cmplx/FA/Prenat (Folic Acid/Vitamin B Complex With C Cap)  1 

cap PO DAILY [facility]


Pantoprazole Sodium (Pantoprazole 40 Mg Vial)  40 mg IVPUSH Q24H [facility]


Thiamine HCl (Thiamine 100 Mg Tab)  100 mg PO DAILY [facility]








Assessment/Plan Comment:: 


1. Admit to inpatient.  Patient will be held nothing by mouth initially except 

for medications and sips of water. Patient was initially given normal saline in 

the emergency department and he will be given a banana bag at 250 mL per hour 

followed by normal saline at 150 mL per hour.


2. Monitor electrolytes including magnesium and lipase every 8 hours. Monitor 

glucose every 2 hours with sliding scale insulin. 


3. Dilaudid for pain control


4. Restart home medications for chronic conditions


5. CIWA with Ativan for alcohol withdrawal


6. DVT PROPHYLAXIS: lOVENOX 40 MG SUBCU DAILY


7.GI prophylaxis: Protonix 40 mg IV daily

## 2021-05-27 NOTE — CT
CT ABDOMEN AND PELVIS WITH CONTRAST  [id number]



INDICATION:  Epigastric pain times 9 hours, now generalized.  



Spiral 3.75 mm axial sections were obtained through the abdomen and pelvis with 
121 mL Isovue 370 at 1.5 mL/sec with sagittal and coronal reconstructions 
05/27/2021 and compared with 01/23/2021.  

Total exam DLP was 1386.11 mGy-cm. 



The lower lung fields and pleural spaces visualized appeared normal.  

The heart appeared normal in size. 

No pericardial effusion was seen. 



The liver appears to be enlarged compared with the previous study and decreased 
in density significantly compared with the previous study suggesting a fatty 
liver.  

No gallstones were demonstrated.  

The adrenal glands, spleen, common bile duct, and retroperitoneum appeared 
essentially normal with mild degree of retroperitoneal lymphadenopathy that is 
nonspecific.  

Kidneys appeared normal except for what appears to be a tiny simple cyst in the 
upper pole of the right kidney and a very tiny possible simple cyst in the lower
middle pole of the left kidney as well as a minimal scar in the upper pole of 
the left kidney.  No obstructive uropathy or solid mass lesions were suggested. 
CT urogram was apparently obtained due to some delay and appeared normal.  The 
urinary bladder was unremarkable.  



The pancreatic head area appears to be relatively heterogeneous with areas of 
decreased density and with fluid surrounding the adjacent duodenal loop and 
inferior aspect of the pancreatic head.  The appearance strongly suggests 
pancreatitis but should be correlated clinically.  The fluid collection extends 
below the duodenal loop adjacent to the pancreas a few centimeters.  No 
generalized phlegmon was identified, however.  



The appendix appeared normal in caliber but did show multiple appendicoliths on 
axial images 86 through 95.  No evidence of free air or bowel obstruction was 
identified.  



No additional organomegaly, mass lesions, or free fluid collections were 
identified in the abdomen or pelvis. 



IMPRESSION: 

1.  Findings are felt to be compatible with  pancreatitis,   very localized 
phlegmon surrounding the distal duodenum and extending inferiorly several 
centimeters.  

2.  Scarring and tiny low density lesions in the kidneys as noted above, likely 
cystic in nature. 

3.  Appearance of increased size of the liver with decreased density compatible 
with fatty liver.  



Report was called to Dr. Jack at 1335 hours, 05/27/2021.

[facility]

## 2021-05-27 NOTE — EDM.PDOC
ED HPI GENERAL MEDICAL PROBLEM





- General


Chief Complaint: Abdominal Pain


Stated Complaint: ABD AND BACK PAIN


Time Seen by Provider: 05/27/21 11:45


Source of Information: Reports: Patient


History Limitations: Reports: No Limitations





- History of Present Illness


INITIAL COMMENTS - FREE TEXT/NARRATIVE: 





c/o abd pain





awoke 3a with pain in upper mid abd, continuous, spread throughout abd, n/v all 

day





has had low back pain x 1w as well since injury playing basketball





denies prior abd pain except for 1m ago when he went to clinic, that pain went 

away





no f/c/d





on a GI med begun 1m ago at [facility] that he takes 30 min before meals, 

not sure of the name





PSH: no prior abd surgery





SH: has 6-7 shots/day, 4-5 cigs/day, denies THC/street drugs, little caffeine, 

here with sig other and infant child





rosendo soft BM today


  ** ABDOMINAL


Pain Score (Numeric/FACES): 10





- Related Data


                                    Allergies











Allergy/AdvReac Type Severity Reaction Status Date / Time


 


No Known Allergies Allergy   Unverified 05/27/21 11:57











Home Meds: 


                                    Home Meds





cloNIDine [Catapres] 0.05 mg PO DAILY 01/18/21 [History]


Albuterol Sulfate [Albuterol Sulfate Hfa] 2 puff INH Q4H PRN 05/27/21 [History]


Losartan [Cozaar] 50 mg PO DAILY 05/27/21 [History]











ED ROS GENERAL





- Review of Systems


Review Of Systems: See Below


Constitutional: Reports: No Symptoms


HEENT: Reports: No Symptoms


Respiratory: Reports: No Symptoms


Cardiovascular: Reports: No Symptoms


Endocrine: Reports: No Symptoms


GI/Abdominal: Reports: Abdominal Pain, Nausea, Vomiting.  Denies: Constipation, 

Diarrhea


: Reports: No Symptoms


Musculoskeletal: Reports: No Symptoms


Skin: Reports: No Symptoms


Neurological: Reports: No Symptoms


Psychiatric: Reports: No Symptoms


Hematologic/Lymphatic: Reports: No Symptoms


Immunologic: Reports: No Symptoms





ED EXAM, GI/ABD





- Physical Exam


Exam: See Below


Exam Limited By: No Limitations


General Appearance: Alert, WD/WN, Mild Distress, Other (alert, lying on R side, 

nontoxic, cooperative, uncomfortable, alcohol on breath)


Nose: Normal Inspection


Throat/Mouth: Normal Inspection, Normal Oropharynx, Normal Voice, No Airway 

Compromise


Head: Atraumatic, Normocephalic


Neck: Normal Inspection, Supple, Non-Tender, Full Range of Motion.  No: 

Lymphadenopathy (R), Lymphadenopathy (L)


Respiratory/Chest: No Respiratory Distress, Lungs Clear, Normal Breath Sounds, 

Chest Non-Tender


Cardiovascular: Regular Rate, Rhythm, No Edema, No Gallop, No JVD, No Murmur


GI/Abdominal Exam: Normal Bowel Sounds, Soft, Other (good BS x 4, soft, 

prominent c/w adipose tissue (and not distention), nonspecific 1+ tender at 

flanks and across upper abd, very little tender across lower abd)


Back Exam: Normal Inspection, Full Range of Motion


Extremities: Normal Inspection, Non-Tender, No Pedal Edema


Neurological: Alert, Oriented, CN II-XII Intact, Normal Cognition, No 

Motor/Sensory Deficits


Psychiatric: Normal Affect, Normal Mood


Skin Exam: Warm, Dry, Intact, Normal Color, No Rash


Lymphatic: No Adenopathy





Course





- Vital Signs


Last Recorded V/S: 


                                Last Vital Signs











Temp  36.4 C   05/27/21 11:35


 


Pulse  113 H  05/27/21 11:35


 


Resp  22 H  05/27/21 11:35


 


BP  148/98 H  05/27/21 11:35


 


Pulse Ox  96   05/27/21 11:35














- Orders/Labs/Meds


Orders: 


                               Active Orders 24 hr











 Category Date Time Status


 


 Admission Status [Patient Status] [ADT] Routine ADT  05/27/21 13:50 Ordered


 


 Abdomen Pelvis w Cont [CT] Stat Exams  05/27/21 12:00 Taken


 


 Sodium Chloride 0.9% [Normal Saline] 1,000 ml Med  05/27/21 12:00 Active





 IV ASDIRECTED   








                                Medication Orders





Sodium Chloride (Normal Saline)  1,000 mls @ 999 mls/hr IV ASDIRECTED SALVADOR


   Last Admin: 05/27/21 12:15  Dose: 999 mls/hr


   Documented by: RYAN








Labs: 


                                Laboratory Tests











  05/27/21 05/27/21 05/27/21 Range/Units





  12:12 12:12 12:12 


 


WBC  8.2    (3.2-10.1)  x10-3/uL


 


RBC  4.94    (3.90-5.90)  x10(6)uL


 


Hgb  16.0    (12.9-17.7)  g/dL


 


Hct  47.4    (38.3-50.1)  %


 


MCV  96.0    (80.8-98.7)  fL


 


MCH  32.5    (27.0-33.3)  pg


 


MCHC  33.8    (28.7-35.3)  g/dL


 


RDW  13.6    (12.4-15.0)  %


 


Plt Count  194    (117-477)  x10(3)uL


 


MPV  9.8    (6.7-11.0)  fL


 


Neut % (Auto)  63.8    (40.3-71.8)  %


 


Lymph % (Auto)  23.6    (15.8-45.3)  %


 


Mono % (Auto)  11.7    (5.5-15.2)  %


 


Eos % (Auto)  0.5    (0.1-6.8)  %


 


Baso % (Auto)  0.4    (0.3-3.8)  %


 


Neut # (Auto)  5.3    (1.7-6.9)  x10-3/uL


 


Lymph # (Auto)  1.9    (0.5-4.5)  x10-3/uL


 


Mono # (Auto)  1.0    (0.0-1.2)  x10-3/uL


 


Eos # (Auto)  0.0    (0.0-0.6)  x10-3/uL


 


Baso # (Auto)  0.0    (0.0-0.3)  x10-3/uL


 


Sodium   136   (135-145)  mmol/L


 


Potassium   3.5   (3.5-5.3)  mmol/L


 


Chloride   98 L   (100-110)  mmol/L


 


Carbon Dioxide   25   (21-32)  mmol/L


 


BUN   7   (7-18)  mg/dL


 


Creatinine   1.2   (0.70-1.30)  mg/dL


 


Est Cr Clr Drug Dosing   86.29   mL/min


 


Estimated GFR (MDRD)   > 60   (>60)  


 


BUN/Creatinine Ratio   5.8 L   (9-20)  


 


Glucose   187 H D   ()  mg/dL


 


Calcium   8.0 L   (8.6-10.2)  mg/dL


 


Total Bilirubin   0.8   (0.1-1.3)  mg/dL


 


AST   247 H* D   (5-25)  IU/L


 


ALT   195 H* D   (12-36)  U/L


 


Alkaline Phosphatase   130 H   ()  IU/L


 


C-Reactive Protein    0.8  (0.5-0.9)  mg/dL


 


Total Protein   8.3 H   (6.0-8.0)  g/dL


 


Albumin   3.6   (3.5-5.2)  g/dL


 


Globulin   4.7   g/dL


 


Albumin/Globulin Ratio   0.8   


 


Lipase    477 H  ()  U/L


 


Urine Color     (YELLOW)  


 


Urine Appearance     (CLEAR)  


 


Urine pH     (5.0-6.5)  


 


Ur Specific Gravity     (1.010-1.025)  


 


Urine Protein     (NEGATIVE)  mg/dL


 


Urine Glucose (UA)     (NORMAL)  mg/dL


 


Urine Ketones     (NEGATIVE)  mg/dL


 


Urine Occult Blood     (NEGATIVE)  


 


Urine Nitrite     (NEGATIVE)  


 


Urine Bilirubin     (NEGATIVE)  


 


Urine Urobilinogen     (NEGATIVE)  mg/dL


 


Ur Leukocyte Esterase     (NEGATIVE)  


 


Urine RBC     (0-5)  


 


Urine WBC     (0-5)  


 


Ur Squamous Epith Cells     (NS,R,O)  


 


Urine Bacteria     (NS)  


 


Urine Mucus     (NS)  


 


Ethyl Alcohol    0.13 H  (<0.03)  %














  05/27/21 Range/Units





  13:20 


 


WBC   (3.2-10.1)  x10-3/uL


 


RBC   (3.90-5.90)  x10(6)uL


 


Hgb   (12.9-17.7)  g/dL


 


Hct   (38.3-50.1)  %


 


MCV   (80.8-98.7)  fL


 


MCH   (27.0-33.3)  pg


 


MCHC   (28.7-35.3)  g/dL


 


RDW   (12.4-15.0)  %


 


Plt Count   (117-477)  x10(3)uL


 


MPV   (6.7-11.0)  fL


 


Neut % (Auto)   (40.3-71.8)  %


 


Lymph % (Auto)   (15.8-45.3)  %


 


Mono % (Auto)   (5.5-15.2)  %


 


Eos % (Auto)   (0.1-6.8)  %


 


Baso % (Auto)   (0.3-3.8)  %


 


Neut # (Auto)   (1.7-6.9)  x10-3/uL


 


Lymph # (Auto)   (0.5-4.5)  x10-3/uL


 


Mono # (Auto)   (0.0-1.2)  x10-3/uL


 


Eos # (Auto)   (0.0-0.6)  x10-3/uL


 


Baso # (Auto)   (0.0-0.3)  x10-3/uL


 


Sodium   (135-145)  mmol/L


 


Potassium   (3.5-5.3)  mmol/L


 


Chloride   (100-110)  mmol/L


 


Carbon Dioxide   (21-32)  mmol/L


 


BUN   (7-18)  mg/dL


 


Creatinine   (0.70-1.30)  mg/dL


 


Est Cr Clr Drug Dosing   mL/min


 


Estimated GFR (MDRD)   (>60)  


 


BUN/Creatinine Ratio   (9-20)  


 


Glucose   ()  mg/dL


 


Calcium   (8.6-10.2)  mg/dL


 


Total Bilirubin   (0.1-1.3)  mg/dL


 


AST   (5-25)  IU/L


 


ALT   (12-36)  U/L


 


Alkaline Phosphatase   ()  IU/L


 


C-Reactive Protein   (0.5-0.9)  mg/dL


 


Total Protein   (6.0-8.0)  g/dL


 


Albumin   (3.5-5.2)  g/dL


 


Globulin   g/dL


 


Albumin/Globulin Ratio   


 


Lipase   ()  U/L


 


Urine Color  Orange  (YELLOW)  


 


Urine Appearance  Clear  (CLEAR)  


 


Urine pH  5.0  (5.0-6.5)  


 


Ur Specific Gravity  1.010  (1.010-1.025)  


 


Urine Protein  Negative  (NEGATIVE)  mg/dL


 


Urine Glucose (UA)  Normal  (NORMAL)  mg/dL


 


Urine Ketones  Negative  (NEGATIVE)  mg/dL


 


Urine Occult Blood  Negative  (NEGATIVE)  


 


Urine Nitrite  Negative  (NEGATIVE)  


 


Urine Bilirubin  Negative  (NEGATIVE)  


 


Urine Urobilinogen  Normal  (NEGATIVE)  mg/dL


 


Ur Leukocyte Esterase  Negative  (NEGATIVE)  


 


Urine RBC  0-5  (0-5)  


 


Urine WBC  5-10 H  (0-5)  


 


Ur Squamous Epith Cells  Occasional  (NS,R,O)  


 


Urine Bacteria  Rare H  (NS)  


 


Urine Mucus  Moderate H  (NS)  


 


Ethyl Alcohol   (<0.03)  %











Meds: 


Medications











Generic Name Dose Route Start Last Admin





  Trade Name Freq  PRN Reason Stop Dose Admin


 


Sodium Chloride  1,000 mls @ 999 mls/hr  05/27/21 12:00  05/27/21 12:15





  Normal Saline  IV   999 mls/hr





  ASDIRECTED SALVADOR   Administration














Discontinued Medications














Generic Name Dose Route Start Last Admin





  Trade Name Freq  PRN Reason Stop Dose Admin


 


Iopamidol  150 ml  05/27/21 12:21  05/27/21 13:18





  Iopamidol 755 Mg/Ml 150 Ml Bottle  IV  05/27/21 12:22  121 ml





  ONETIME ONE   Administration


 


Ketorolac Tromethamine  30 mg  05/27/21 11:59  05/27/21 12:18





  Ketorolac 30 Mg/Ml Sdv  IVPUSH  05/27/21 12:00  30 mg





  ONETIME ONE   Administration


 


Ondansetron HCl  4 mg  05/27/21 11:59  05/27/21 12:18





  Ondansetron 4 Mg/2 Ml Sdv  IVPUSH  05/27/21 12:00  4 mg





  ONETIME ONE   Administration














- Re-Assessments/Exams


Free Text/Narrative Re-Assessment/Exam: 





05/27/21 13:54


MPMP neg x 1y





d/w Dr Landin, phlegmon at head of pancreas, fatty liver





pt denies alc tx in past, did inpt tx for IVDA (heroin) in [place] nearly 4y 

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

nk alc daily





no previous h/o pancreatitis





is agreeable to admission





N better, not gone





pain down to 8/10 after Toradol IV, says he is more comfortable and not restless

on the bed





d/w Dr Rojas who accepted him in admission and will come see pt











Departure





- Departure


Time of Disposition: 13:51


Disposition: Admitted As Inpatient 65


Condition: Good


Clinical Impression: 


 Acute pancreatitis, Phlegmon of pancreas, Fatty liver, Alcohol abuse, History 

of intravenous drug abuse, Elevated total protein, Hyperglycemia








- Discharge Information


*PRESCRIPTION DRUG MONITORING PROGRAM REVIEWED*: Yes


*COPY OF PRESCRIPTION DRUG MONITORING REPORT IN PATIENT ROXANNE: No


Forms:  ED Department Discharge





Sepsis Event Note (ED)





- Focused Exam


Vital Signs: 


                                   Vital Signs











  Temp Pulse Resp BP Pulse Ox


 


 05/27/21 11:35  36.4 C  113 H  22 H  148/98 H  96














- My Orders


Last 24 Hours: 


My Active Orders





05/27/21 12:00


Abdomen Pelvis w Cont [CT] Stat 


Sodium Chloride 0.9% [Normal Saline] 1,000 ml IV ASDIRECTED 





05/27/21 13:50


Admission Status [Patient Status] [ADT] Routine 














- Assessment/Plan


Last 24 Hours: 


My Active Orders





05/27/21 12:00


Abdomen Pelvis w Cont [CT] Stat 


Sodium Chloride 0.9% [Normal Saline] 1,000 ml IV ASDIRECTED 





05/27/21 13:50


Admission Status [Patient Status] [ADT] Routine

## 2021-05-28 RX ADMIN — LORAZEPAM SCH MG: 2 INJECTION INTRAMUSCULAR; INTRAVENOUS at 02:17

## 2021-05-28 RX ADMIN — INSULIN LISPRO SCH UNIT: 100 INJECTION, SOLUTION INTRAVENOUS; SUBCUTANEOUS at 15:04

## 2021-05-28 RX ADMIN — Medication PRN ML: at 02:17

## 2021-05-28 RX ADMIN — Medication PRN ML: at 06:00

## 2021-05-28 RX ADMIN — ONDANSETRON PRN MG: 2 INJECTION, SOLUTION INTRAMUSCULAR; INTRAVENOUS at 14:58

## 2021-05-28 RX ADMIN — INSULIN LISPRO SCH UNIT: 100 INJECTION, SOLUTION INTRAVENOUS; SUBCUTANEOUS at 10:30

## 2021-05-28 RX ADMIN — HYDROMORPHONE HYDROCHLORIDE PRN MG: 2 INJECTION INTRAMUSCULAR; INTRAVENOUS; SUBCUTANEOUS at 06:56

## 2021-05-28 RX ADMIN — HYDROMORPHONE HYDROCHLORIDE PRN MG: 2 INJECTION INTRAMUSCULAR; INTRAVENOUS; SUBCUTANEOUS at 02:50

## 2021-05-28 RX ADMIN — INSULIN LISPRO SCH UNIT: 100 INJECTION, SOLUTION INTRAVENOUS; SUBCUTANEOUS at 03:49

## 2021-05-28 RX ADMIN — HYDROMORPHONE HYDROCHLORIDE PRN MG: 2 INJECTION INTRAMUSCULAR; INTRAVENOUS; SUBCUTANEOUS at 00:36

## 2021-05-28 RX ADMIN — LORAZEPAM SCH MG: 2 INJECTION INTRAMUSCULAR; INTRAVENOUS at 05:59

## 2021-05-28 RX ADMIN — INSULIN LISPRO SCH UNIT: 100 INJECTION, SOLUTION INTRAVENOUS; SUBCUTANEOUS at 06:09

## 2021-05-28 RX ADMIN — LORAZEPAM SCH MG: 2 INJECTION INTRAMUSCULAR; INTRAVENOUS at 14:59

## 2021-05-28 RX ADMIN — ONDANSETRON PRN MG: 2 INJECTION, SOLUTION INTRAMUSCULAR; INTRAVENOUS at 00:08

## 2021-05-28 RX ADMIN — INSULIN LISPRO SCH UNIT: 100 INJECTION, SOLUTION INTRAVENOUS; SUBCUTANEOUS at 01:24

## 2021-05-28 RX ADMIN — INSULIN LISPRO SCH: 100 INJECTION, SOLUTION INTRAVENOUS; SUBCUTANEOUS at 07:05

## 2021-05-28 RX ADMIN — KETOROLAC TROMETHAMINE SCH MG: 30 INJECTION, SOLUTION INTRAMUSCULAR at 08:44

## 2021-05-28 RX ADMIN — INSULIN LISPRO SCH UNIT: 100 INJECTION, SOLUTION INTRAVENOUS; SUBCUTANEOUS at 16:50

## 2021-05-28 RX ADMIN — KETOROLAC TROMETHAMINE SCH MG: 30 INJECTION, SOLUTION INTRAMUSCULAR at 04:21

## 2021-05-28 RX ADMIN — KETOROLAC TROMETHAMINE SCH MG: 30 INJECTION, SOLUTION INTRAMUSCULAR at 16:10

## 2021-05-28 RX ADMIN — HYDROMORPHONE HYDROCHLORIDE PRN MG: 2 INJECTION INTRAMUSCULAR; INTRAVENOUS; SUBCUTANEOUS at 08:58

## 2021-05-28 RX ADMIN — HYDROMORPHONE HYDROCHLORIDE PRN MG: 2 INJECTION INTRAMUSCULAR; INTRAVENOUS; SUBCUTANEOUS at 11:20

## 2021-05-28 RX ADMIN — ONDANSETRON PRN MG: 2 INJECTION, SOLUTION INTRAMUSCULAR; INTRAVENOUS at 06:01

## 2021-05-28 RX ADMIN — INSULIN LISPRO SCH UNIT: 100 INJECTION, SOLUTION INTRAVENOUS; SUBCUTANEOUS at 08:54

## 2021-05-28 RX ADMIN — INSULIN LISPRO SCH UNIT: 100 INJECTION, SOLUTION INTRAVENOUS; SUBCUTANEOUS at 12:19

## 2021-05-28 NOTE — CR
INDICATION:   Abdominal distension.  



ABDOMEN, TWO VIEW:  Four images of the abdomen in supine and upright projections
were obtained 05/28/21 and compared with 05/01/19.  



Subsegmental atelectatic appearing changes are noted at the lung bases. 



Pattern of gas and feces is nonspecific without evidence of gross free air or 
obstruction.  



There are a few phleboliths in the pelvis.  



No definite organomegaly or mass lesions were identified.  



IMPRESSION:  

1.  Nonacute appearance of the abdomen. 

2.  Subsegmental atelectatic changes both lung bases, new compared with chest x-
ray of 01/18/21. 

[facility]

## 2021-05-28 NOTE — PCM.PN
- General Info


Date of Service: 05/28/21


Admission Dx/Problem (Free Text): 


                           Admission Diagnosis/Problem





Admission Diagnosis/Problem      Pancreatitis








Subjective Update: 


Osman is struggling with nausea, abdominal pain and distention, and an elevated 

blood pressure. He did sleep better however he specimen is markedly distended 

and the pain is not controlled despite IV Dilaudid.


Functional Status: Denies: Pain Controlled, Tolerating Diet





- Review of Systems


General: Reports: No Symptoms


HEENT: Reports: No Symptoms


Pulmonary: Reports: No Symptoms


Cardiovascular: Reports: No Symptoms


Gastrointestinal: Reports: Abdominal Pain, Decreased Appetite, Nausea


Genitourinary: Reports: No Symptoms


Musculoskeletal: Reports: No Symptoms





- Patient Data


Vitals - Most Recent: 


                                Last Vital Signs











Temp  98.2 F   05/28/21 04:00


 


Pulse  106 H  05/28/21 04:00


 


Resp  20   05/28/21 04:00


 


BP  164/120 H  05/28/21 08:43


 


Pulse Ox  96   05/28/21 04:00











Weight - Most Recent: 108.227 kg


I&O - Last 24 Hours: 


                                 Intake & Output











 05/27/21 05/28/21 05/28/21





 22:59 06:59 14:59


 


Intake Total  2430 


 


Balance  2430 











Lab Results Last 24 Hours: 


                         Laboratory Results - last 24 hr











  05/27/21 05/27/21 05/27/21 Range/Units





  12:12 12:12 12:12 


 


WBC  8.2    (3.2-10.1)  x10-3/uL


 


RBC  4.94    (3.90-5.90)  x10(6)uL


 


Hgb  16.0    (12.9-17.7)  g/dL


 


Hct  47.4    (38.3-50.1)  %


 


MCV  96.0    (80.8-98.7)  fL


 


MCH  32.5    (27.0-33.3)  pg


 


MCHC  33.8    (28.7-35.3)  g/dL


 


RDW  13.6    (12.4-15.0)  %


 


Plt Count  194    (117-477)  x10(3)uL


 


MPV  9.8    (6.7-11.0)  fL


 


Neut % (Auto)  63.8    (40.3-71.8)  %


 


Lymph % (Auto)  23.6    (15.8-45.3)  %


 


Mono % (Auto)  11.7    (5.5-15.2)  %


 


Eos % (Auto)  0.5    (0.1-6.8)  %


 


Baso % (Auto)  0.4    (0.3-3.8)  %


 


Neut # (Auto)  5.3    (1.7-6.9)  x10-3/uL


 


Lymph # (Auto)  1.9    (0.5-4.5)  x10-3/uL


 


Mono # (Auto)  1.0    (0.0-1.2)  x10-3/uL


 


Eos # (Auto)  0.0    (0.0-0.6)  x10-3/uL


 


Baso # (Auto)  0.0    (0.0-0.3)  x10-3/uL


 


Sodium   136   (135-145)  mmol/L


 


Potassium   3.5   (3.5-5.3)  mmol/L


 


Chloride   98 L   (100-110)  mmol/L


 


Carbon Dioxide   25   (21-32)  mmol/L


 


BUN   7   (7-18)  mg/dL


 


Creatinine   1.2   (0.70-1.30)  mg/dL


 


Est Cr Clr Drug Dosing   86.29   mL/min


 


Estimated GFR (MDRD)   > 60   (>60)  


 


BUN/Creatinine Ratio   5.8 L   (9-20)  


 


Glucose   187 H D   ()  mg/dL


 


Calcium   8.0 L   (8.6-10.2)  mg/dL


 


Magnesium     (1.8-2.5)  mg/dL


 


Total Bilirubin   0.8   (0.1-1.3)  mg/dL


 


AST   247 H* D   (5-25)  IU/L


 


ALT   195 H* D   (12-36)  U/L


 


Alkaline Phosphatase   130 H   ()  IU/L


 


C-Reactive Protein    0.8  (0.5-0.9)  mg/dL


 


Total Protein   8.3 H   (6.0-8.0)  g/dL


 


Albumin   3.6   (3.5-5.2)  g/dL


 


Globulin   4.7   g/dL


 


Albumin/Globulin Ratio   0.8   


 


Lipase    477 H  ()  U/L


 


Urine Color     (YELLOW)  


 


Urine Appearance     (CLEAR)  


 


Urine pH     (5.0-6.5)  


 


Ur Specific Gravity     (1.010-1.025)  


 


Urine Protein     (NEGATIVE)  mg/dL


 


Urine Glucose (UA)     (NORMAL)  mg/dL


 


Urine Ketones     (NEGATIVE)  mg/dL


 


Urine Occult Blood     (NEGATIVE)  


 


Urine Nitrite     (NEGATIVE)  


 


Urine Bilirubin     (NEGATIVE)  


 


Urine Urobilinogen     (NEGATIVE)  mg/dL


 


Ur Leukocyte Esterase     (NEGATIVE)  


 


Urine RBC     (0-5)  


 


Urine WBC     (0-5)  


 


Ur Squamous Epith Cells     (NS,R,O)  


 


Urine Bacteria     (NS)  


 


Urine Mucus     (NS)  


 


Ethyl Alcohol    0.13 H  (<0.03)  %


 


SARS-CoV-2 RNA (JOE)     (NEGATIVE)  














  05/27/21 05/27/21 05/27/21 Range/Units





  12:12 13:20 13:55 


 


WBC     (3.2-10.1)  x10-3/uL


 


RBC     (3.90-5.90)  x10(6)uL


 


Hgb     (12.9-17.7)  g/dL


 


Hct     (38.3-50.1)  %


 


MCV     (80.8-98.7)  fL


 


MCH     (27.0-33.3)  pg


 


MCHC     (28.7-35.3)  g/dL


 


RDW     (12.4-15.0)  %


 


Plt Count     (117-477)  x10(3)uL


 


MPV     (6.7-11.0)  fL


 


Neut % (Auto)     (40.3-71.8)  %


 


Lymph % (Auto)     (15.8-45.3)  %


 


Mono % (Auto)     (5.5-15.2)  %


 


Eos % (Auto)     (0.1-6.8)  %


 


Baso % (Auto)     (0.3-3.8)  %


 


Neut # (Auto)     (1.7-6.9)  x10-3/uL


 


Lymph # (Auto)     (0.5-4.5)  x10-3/uL


 


Mono # (Auto)     (0.0-1.2)  x10-3/uL


 


Eos # (Auto)     (0.0-0.6)  x10-3/uL


 


Baso # (Auto)     (0.0-0.3)  x10-3/uL


 


Sodium     (135-145)  mmol/L


 


Potassium     (3.5-5.3)  mmol/L


 


Chloride     (100-110)  mmol/L


 


Carbon Dioxide     (21-32)  mmol/L


 


BUN     (7-18)  mg/dL


 


Creatinine     (0.70-1.30)  mg/dL


 


Est Cr Clr Drug Dosing     mL/min


 


Estimated GFR (MDRD)     (>60)  


 


BUN/Creatinine Ratio     (9-20)  


 


Glucose     ()  mg/dL


 


Calcium     (8.6-10.2)  mg/dL


 


Magnesium  1.6 L    (1.8-2.5)  mg/dL


 


Total Bilirubin     (0.1-1.3)  mg/dL


 


AST     (5-25)  IU/L


 


ALT     (12-36)  U/L


 


Alkaline Phosphatase     ()  IU/L


 


C-Reactive Protein     (0.5-0.9)  mg/dL


 


Total Protein     (6.0-8.0)  g/dL


 


Albumin     (3.5-5.2)  g/dL


 


Globulin     g/dL


 


Albumin/Globulin Ratio     


 


Lipase     ()  U/L


 


Urine Color   Orange   (YELLOW)  


 


Urine Appearance   Clear   (CLEAR)  


 


Urine pH   5.0   (5.0-6.5)  


 


Ur Specific Gravity   1.010   (1.010-1.025)  


 


Urine Protein   Negative   (NEGATIVE)  mg/dL


 


Urine Glucose (UA)   Normal   (NORMAL)  mg/dL


 


Urine Ketones   Negative   (NEGATIVE)  mg/dL


 


Urine Occult Blood   Negative   (NEGATIVE)  


 


Urine Nitrite   Negative   (NEGATIVE)  


 


Urine Bilirubin   Negative   (NEGATIVE)  


 


Urine Urobilinogen   Normal   (NEGATIVE)  mg/dL


 


Ur Leukocyte Esterase   Negative   (NEGATIVE)  


 


Urine RBC   0-5   (0-5)  


 


Urine WBC   5-10 H   (0-5)  


 


Ur Squamous Epith Cells   Occasional   (NS,R,O)  


 


Urine Bacteria   Rare H   (NS)  


 


Urine Mucus   Moderate H   (NS)  


 


Ethyl Alcohol     (<0.03)  %


 


SARS-CoV-2 RNA (JOE)    Negative  (NEGATIVE)  














  05/27/21 05/28/21 05/28/21 Range/Units





  16:20 00:05 00:05 


 


WBC     (3.2-10.1)  x10-3/uL


 


RBC     (3.90-5.90)  x10(6)uL


 


Hgb     (12.9-17.7)  g/dL


 


Hct     (38.3-50.1)  %


 


MCV     (80.8-98.7)  fL


 


MCH     (27.0-33.3)  pg


 


MCHC     (28.7-35.3)  g/dL


 


RDW     (12.4-15.0)  %


 


Plt Count     (117-477)  x10(3)uL


 


MPV     (6.7-11.0)  fL


 


Neut % (Auto)     (40.3-71.8)  %


 


Lymph % (Auto)     (15.8-45.3)  %


 


Mono % (Auto)     (5.5-15.2)  %


 


Eos % (Auto)     (0.1-6.8)  %


 


Baso % (Auto)     (0.3-3.8)  %


 


Neut # (Auto)     (1.7-6.9)  x10-3/uL


 


Lymph # (Auto)     (0.5-4.5)  x10-3/uL


 


Mono # (Auto)     (0.0-1.2)  x10-3/uL


 


Eos # (Auto)     (0.0-0.6)  x10-3/uL


 


Baso # (Auto)     (0.0-0.3)  x10-3/uL


 


Sodium  138   138  (135-145)  mmol/L


 


Potassium  3.7   4.9  D  (3.5-5.3)  mmol/L


 


Chloride  100   102  (100-110)  mmol/L


 


Carbon Dioxide  23   24  (21-32)  mmol/L


 


BUN  6 L   7  (7-18)  mg/dL


 


Creatinine  1.2   1.3  (0.70-1.30)  mg/dL


 


Est Cr Clr Drug Dosing  86.29   79.65  mL/min


 


Estimated GFR (MDRD)  > 60   > 60  (>60)  


 


BUN/Creatinine Ratio  5.0 L   5.4 L  (9-20)  


 


Glucose  142 H   162 H  ()  mg/dL


 


Calcium  7.6 L   7.5 L  (8.6-10.2)  mg/dL


 


Magnesium   2.2   (1.8-2.5)  mg/dL


 


Total Bilirubin  0.8   1.2  (0.1-1.3)  mg/dL


 


AST  207 H* D   162 H* D  (5-25)  IU/L


 


ALT  181 H*   159 H* D  (12-36)  U/L


 


Alkaline Phosphatase  123 H   129 H  ()  IU/L


 


C-Reactive Protein     (0.5-0.9)  mg/dL


 


Total Protein  8.0   7.9  (6.0-8.0)  g/dL


 


Albumin  3.4 L   3.3 L  (3.5-5.2)  g/dL


 


Globulin  4.6   4.6  g/dL


 


Albumin/Globulin Ratio  0.7   0.7  


 


Lipase     ()  U/L


 


Urine Color     (YELLOW)  


 


Urine Appearance     (CLEAR)  


 


Urine pH     (5.0-6.5)  


 


Ur Specific Gravity     (1.010-1.025)  


 


Urine Protein     (NEGATIVE)  mg/dL


 


Urine Glucose (UA)     (NORMAL)  mg/dL


 


Urine Ketones     (NEGATIVE)  mg/dL


 


Urine Occult Blood     (NEGATIVE)  


 


Urine Nitrite     (NEGATIVE)  


 


Urine Bilirubin     (NEGATIVE)  


 


Urine Urobilinogen     (NEGATIVE)  mg/dL


 


Ur Leukocyte Esterase     (NEGATIVE)  


 


Urine RBC     (0-5)  


 


Urine WBC     (0-5)  


 


Ur Squamous Epith Cells     (NS,R,O)  


 


Urine Bacteria     (NS)  


 


Urine Mucus     (NS)  


 


Ethyl Alcohol     (<0.03)  %


 


SARS-CoV-2 RNA (JOE)     (NEGATIVE)  











Med Orders - Current: 


                               Current Medications





Albuterol (Albuterol 8 Gm Inhaler)  0 gm INH Q4H PRN


   PRN Reason: Dyspnea


Clonidine HCl (Clonidine 0.1 Mg Tab)  0.05 mg PO DAILY [facility]


   Last Admin: 05/28/21 08:41 Dose:  0.05 mg


   Documented by: 


Dextrose/Water (50% Dextrose In Water 50 Ml Syringe)  50 ml IVPUSH ASDIRECTED 

PRN


   PRN Reason: Hypoglycemia


Enoxaparin Sodium (Enoxaparin 40 Mg/0.4 Ml Syringe)  40 mg SUBCUT DAILY@1600 [facility]


   Last Admin: 05/27/21 17:19 Dose:  40 mg


   Documented by: 


Glucagon (Glucagon,Human Recombinant 1 Mg Vial)  1 mg IM ASDIRECTED PRN


   PRN Reason: Hypoglycemia


Hydromorphone HCl (Hydromorphone 2 Mg/Ml Sdv)  0.5 mg IVPUSH Q2H PRN


   PRN Reason: Pain (severe 7-10)


   Last Admin: 05/28/21 06:56 Dose:  0.5 mg


   Documented by: 


Hydroxyzine HCl (Hydroxyzine Hcl 50 Mg/Ml Sdv)  50 mg IM Q6H PRN


   PRN Reason: Nausea


Sodium Chloride (Normal Saline)  1,000 mls @ 999 mls/hr IV ASDIRECTED [facility]


   Last Infusion: 05/27/21 12:55 Dose:  500 mls/hr


   Documented by: 


Sodium Chloride (Normal Saline)  1,000 mls @ 150 mls/hr IV ASDIRECTED [facility]


   Last Admin: 05/28/21 02:51 Dose:  150 mls/hr


   Documented by: 


Insulin Human Lispro (Insulin Lispro 100 Unit/Ml 3 Ml Kwikpen)  0 unit SUBCUT 

Q2H [facility]; Protocol


   Last Admin: 05/28/21 07:05 Dose:  Not Given


   Documented by: 


Ketorolac Tromethamine (Ketorolac 30 Mg/Ml Sdv)  30 mg IVPUSH Q6H [facility]


   Stop: 06/01/21 21:40


   Last Admin: 05/28/21 08:44 Dose:  30 mg


   Documented by: 


Labetalol HCl (Labetalol 20 Mg/4 Ml Syringe)  10 mg IV Q4H PRN; Protocol


   PRN Reason: Hypertension


   Last Admin: 05/28/21 08:45 Dose:  10 mg


   Documented by: 


Lorazepam (Lorazepam 2 Mg/Ml Sdv)  0 mg IV ASDIRECTED [facility]; Protocol


   Last Admin: 05/28/21 05:59 Dose:  1 mg


   Documented by: 


Lorazepam (Lorazepam 1 Mg Tab)  0 mg PO ASDIRECTED [facility]; Protocol


   Last Admin: 05/27/21 17:27 Dose:  1 mg


   Documented by: 


Losartan Potassium (Losartan 50 Mg Tab)  50 mg PO DAILY [facility]


   Last Admin: 05/28/21 08:43 Dose:  50 mg


   Documented by: 


Multivit/Ca Carb/B Cmplx/FA/Prenat (Folic Acid/Vitamin B Complex With C Cap)  1 

cap PO DAILY [facility]


   Last Admin: 05/28/21 08:43 Dose:  1 cap


   Documented by: 


Nicotine (Nicotine 7 Mg/24 Hr Patch)  7 mg TRDERM DAILY@1800 [facility]


Ondansetron HCl (Ondansetron 4 Mg/2 Ml Sdv)  4 mg IVPUSH Q6H PRN


   PRN Reason: Nausea/Vomiting


   Last Admin: 05/28/21 06:01 Dose:  4 mg


   Documented by: 


Pantoprazole Sodium (Pantoprazole 40 Mg Vial)  40 mg IVPUSH Q24H [facility]


   Last Admin: 05/27/21 17:19 Dose:  40 mg


   Documented by: 


Sodium Chloride (Sodium Chloride 0.9% 10 Ml Syringe)  10 ml FLUSH ASDIRECTED PRN


   PRN Reason: IV flush


   Last Admin: 05/28/21 06:00 Dose:  10 ml


   Documented by: 


Thiamine HCl (Thiamine 100 Mg Tab)  100 mg PO DAILY [facility]


   Last Admin: 05/28/21 08:43 Dose:  100 mg


   Documented by: 





Discontinued Medications





Hydromorphone HCl (Hydromorphone 2 Mg/Ml Sdv)  2 mg IVPUSH Q3H PRN


   PRN Reason: Pain (severe 7-10)


   Last Admin: 05/27/21 15:07 Dose:  2 mg


   Documented by: 


Hydromorphone HCl (Hydromorphone 2 Mg/Ml Sdv)  2 mg IVPUSH Q2H PRN


   PRN Reason: Pain (severe 7-10)


   Last Admin: 05/27/21 19:45 Dose:  2 mg


   Documented by: 


Multivitamins/Minerals 10 ml/Thiamine HCl 100 mg/ Folic Acid 1 mg/ Magnesium 

Sulfate 3 gm/ Sodium Chloride  1,017.2 mls @ 250 mls/hr IV ONETIME ONE


   Stop: 05/27/21 19:04


   Last Admin: 05/27/21 15:24 Dose:  250 mls/hr


   Documented by: 


Iopamidol (Iopamidol 755 Mg/Ml 150 Ml Bottle)  150 ml IV ONETIME ONE


   Stop: 05/27/21 12:22


   Last Admin: 05/27/21 13:18 Dose:  121 ml


   Documented by: 


Ketorolac Tromethamine (Ketorolac 30 Mg/Ml Sdv)  30 mg IVPUSH ONETIME ONE


   Stop: 05/27/21 12:00


   Last Admin: 05/27/21 12:18 Dose:  30 mg


   Documented by: 


Nicotine (Nicotine 7 Mg/24 Hr Patch)  7 mg TRDERM DAILY [facility]


   Last Admin: 05/27/21 17:27 Dose:  7 mg


   Documented by: 


Ondansetron HCl (Ondansetron 4 Mg/2 Ml Sdv)  4 mg IVPUSH ONETIME ONE


   Stop: 05/27/21 12:00


   Last Admin: 05/27/21 12:18 Dose:  4 mg


   Documented by: 











- Exam


Quality Assessment: No: Supplemental Oxygen


General: Alert, Sedated


Neck: Supple


Lungs: Clear to Auscultation


Cardiovascular: Regular Rate


GI/Abdominal Exam: Distended, Rigid, Rebound


Extremities: Normal Inspection





- Patient Data


Lab Results Last 24 hrs: 


                         Laboratory Results - last 24 hr











  05/27/21 05/27/21 05/27/21 Range/Units





  12:12 12:12 12:12 


 


WBC  8.2    (3.2-10.1)  x10-3/uL


 


RBC  4.94    (3.90-5.90)  x10(6)uL


 


Hgb  16.0    (12.9-17.7)  g/dL


 


Hct  47.4    (38.3-50.1)  %


 


MCV  96.0    (80.8-98.7)  fL


 


MCH  32.5    (27.0-33.3)  pg


 


MCHC  33.8    (28.7-35.3)  g/dL


 


RDW  13.6    (12.4-15.0)  %


 


Plt Count  194    (117-477)  x10(3)uL


 


MPV  9.8    (6.7-11.0)  fL


 


Neut % (Auto)  63.8    (40.3-71.8)  %


 


Lymph % (Auto)  23.6    (15.8-45.3)  %


 


Mono % (Auto)  11.7    (5.5-15.2)  %


 


Eos % (Auto)  0.5    (0.1-6.8)  %


 


Baso % (Auto)  0.4    (0.3-3.8)  %


 


Neut # (Auto)  5.3    (1.7-6.9)  x10-3/uL


 


Lymph # (Auto)  1.9    (0.5-4.5)  x10-3/uL


 


Mono # (Auto)  1.0    (0.0-1.2)  x10-3/uL


 


Eos # (Auto)  0.0    (0.0-0.6)  x10-3/uL


 


Baso # (Auto)  0.0    (0.0-0.3)  x10-3/uL


 


Sodium   136   (135-145)  mmol/L


 


Potassium   3.5   (3.5-5.3)  mmol/L


 


Chloride   98 L   (100-110)  mmol/L


 


Carbon Dioxide   25   (21-32)  mmol/L


 


BUN   7   (7-18)  mg/dL


 


Creatinine   1.2   (0.70-1.30)  mg/dL


 


Est Cr Clr Drug Dosing   86.29   mL/min


 


Estimated GFR (MDRD)   > 60   (>60)  


 


BUN/Creatinine Ratio   5.8 L   (9-20)  


 


Glucose   187 H D   ()  mg/dL


 


Calcium   8.0 L   (8.6-10.2)  mg/dL


 


Magnesium     (1.8-2.5)  mg/dL


 


Total Bilirubin   0.8   (0.1-1.3)  mg/dL


 


AST   247 H* D   (5-25)  IU/L


 


ALT   195 H* D   (12-36)  U/L


 


Alkaline Phosphatase   130 H   ()  IU/L


 


C-Reactive Protein    0.8  (0.5-0.9)  mg/dL


 


Total Protein   8.3 H   (6.0-8.0)  g/dL


 


Albumin   3.6   (3.5-5.2)  g/dL


 


Globulin   4.7   g/dL


 


Albumin/Globulin Ratio   0.8   


 


Lipase    477 H  ()  U/L


 


Urine Color     (YELLOW)  


 


Urine Appearance     (CLEAR)  


 


Urine pH     (5.0-6.5)  


 


Ur Specific Gravity     (1.010-1.025)  


 


Urine Protein     (NEGATIVE)  mg/dL


 


Urine Glucose (UA)     (NORMAL)  mg/dL


 


Urine Ketones     (NEGATIVE)  mg/dL


 


Urine Occult Blood     (NEGATIVE)  


 


Urine Nitrite     (NEGATIVE)  


 


Urine Bilirubin     (NEGATIVE)  


 


Urine Urobilinogen     (NEGATIVE)  mg/dL


 


Ur Leukocyte Esterase     (NEGATIVE)  


 


Urine RBC     (0-5)  


 


Urine WBC     (0-5)  


 


Ur Squamous Epith Cells     (NS,R,O)  


 


Urine Bacteria     (NS)  


 


Urine Mucus     (NS)  


 


Ethyl Alcohol    0.13 H  (<0.03)  %


 


SARS-CoV-2 RNA (JOE)     (NEGATIVE)  














  05/27/21 05/27/21 05/27/21 Range/Units





  12:12 13:20 13:55 


 


WBC     (3.2-10.1)  x10-3/uL


 


RBC     (3.90-5.90)  x10(6)uL


 


Hgb     (12.9-17.7)  g/dL


 


Hct     (38.3-50.1)  %


 


MCV     (80.8-98.7)  fL


 


MCH     (27.0-33.3)  pg


 


MCHC     (28.7-35.3)  g/dL


 


RDW     (12.4-15.0)  %


 


Plt Count     (117-477)  x10(3)uL


 


MPV     (6.7-11.0)  fL


 


Neut % (Auto)     (40.3-71.8)  %


 


Lymph % (Auto)     (15.8-45.3)  %


 


Mono % (Auto)     (5.5-15.2)  %


 


Eos % (Auto)     (0.1-6.8)  %


 


Baso % (Auto)     (0.3-3.8)  %


 


Neut # (Auto)     (1.7-6.9)  x10-3/uL


 


Lymph # (Auto)     (0.5-4.5)  x10-3/uL


 


Mono # (Auto)     (0.0-1.2)  x10-3/uL


 


Eos # (Auto)     (0.0-0.6)  x10-3/uL


 


Baso # (Auto)     (0.0-0.3)  x10-3/uL


 


Sodium     (135-145)  mmol/L


 


Potassium     (3.5-5.3)  mmol/L


 


Chloride     (100-110)  mmol/L


 


Carbon Dioxide     (21-32)  mmol/L


 


BUN     (7-18)  mg/dL


 


Creatinine     (0.70-1.30)  mg/dL


 


Est Cr Clr Drug Dosing     mL/min


 


Estimated GFR (MDRD)     (>60)  


 


BUN/Creatinine Ratio     (9-20)  


 


Glucose     ()  mg/dL


 


Calcium     (8.6-10.2)  mg/dL


 


Magnesium  1.6 L    (1.8-2.5)  mg/dL


 


Total Bilirubin     (0.1-1.3)  mg/dL


 


AST     (5-25)  IU/L


 


ALT     (12-36)  U/L


 


Alkaline Phosphatase     ()  IU/L


 


C-Reactive Protein     (0.5-0.9)  mg/dL


 


Total Protein     (6.0-8.0)  g/dL


 


Albumin     (3.5-5.2)  g/dL


 


Globulin     g/dL


 


Albumin/Globulin Ratio     


 


Lipase     ()  U/L


 


Urine Color   Orange   (YELLOW)  


 


Urine Appearance   Clear   (CLEAR)  


 


Urine pH   5.0   (5.0-6.5)  


 


Ur Specific Gravity   1.010   (1.010-1.025)  


 


Urine Protein   Negative   (NEGATIVE)  mg/dL


 


Urine Glucose (UA)   Normal   (NORMAL)  mg/dL


 


Urine Ketones   Negative   (NEGATIVE)  mg/dL


 


Urine Occult Blood   Negative   (NEGATIVE)  


 


Urine Nitrite   Negative   (NEGATIVE)  


 


Urine Bilirubin   Negative   (NEGATIVE)  


 


Urine Urobilinogen   Normal   (NEGATIVE)  mg/dL


 


Ur Leukocyte Esterase   Negative   (NEGATIVE)  


 


Urine RBC   0-5   (0-5)  


 


Urine WBC   5-10 H   (0-5)  


 


Ur Squamous Epith Cells   Occasional   (NS,R,O)  


 


Urine Bacteria   Rare H   (NS)  


 


Urine Mucus   Moderate H   (NS)  


 


Ethyl Alcohol     (<0.03)  %


 


SARS-CoV-2 RNA (JOE)    Negative  (NEGATIVE)  














  05/27/21 05/28/21 05/28/21 Range/Units





  16:20 00:05 00:05 


 


WBC     (3.2-10.1)  x10-3/uL


 


RBC     (3.90-5.90)  x10(6)uL


 


Hgb     (12.9-17.7)  g/dL


 


Hct     (38.3-50.1)  %


 


MCV     (80.8-98.7)  fL


 


MCH     (27.0-33.3)  pg


 


MCHC     (28.7-35.3)  g/dL


 


RDW     (12.4-15.0)  %


 


Plt Count     (117-477)  x10(3)uL


 


MPV     (6.7-11.0)  fL


 


Neut % (Auto)     (40.3-71.8)  %


 


Lymph % (Auto)     (15.8-45.3)  %


 


Mono % (Auto)     (5.5-15.2)  %


 


Eos % (Auto)     (0.1-6.8)  %


 


Baso % (Auto)     (0.3-3.8)  %


 


Neut # (Auto)     (1.7-6.9)  x10-3/uL


 


Lymph # (Auto)     (0.5-4.5)  x10-3/uL


 


Mono # (Auto)     (0.0-1.2)  x10-3/uL


 


Eos # (Auto)     (0.0-0.6)  x10-3/uL


 


Baso # (Auto)     (0.0-0.3)  x10-3/uL


 


Sodium  138   138  (135-145)  mmol/L


 


Potassium  3.7   4.9  D  (3.5-5.3)  mmol/L


 


Chloride  100   102  (100-110)  mmol/L


 


Carbon Dioxide  23   24  (21-32)  mmol/L


 


BUN  6 L   7  (7-18)  mg/dL


 


Creatinine  1.2   1.3  (0.70-1.30)  mg/dL


 


Est Cr Clr Drug Dosing  86.29   79.65  mL/min


 


Estimated GFR (MDRD)  > 60   > 60  (>60)  


 


BUN/Creatinine Ratio  5.0 L   5.4 L  (9-20)  


 


Glucose  142 H   162 H  ()  mg/dL


 


Calcium  7.6 L   7.5 L  (8.6-10.2)  mg/dL


 


Magnesium   2.2   (1.8-2.5)  mg/dL


 


Total Bilirubin  0.8   1.2  (0.1-1.3)  mg/dL


 


AST  207 H* D   162 H* D  (5-25)  IU/L


 


ALT  181 H*   159 H* D  (12-36)  U/L


 


Alkaline Phosphatase  123 H   129 H  ()  IU/L


 


C-Reactive Protein     (0.5-0.9)  mg/dL


 


Total Protein  8.0   7.9  (6.0-8.0)  g/dL


 


Albumin  3.4 L   3.3 L  (3.5-5.2)  g/dL


 


Globulin  4.6   4.6  g/dL


 


Albumin/Globulin Ratio  0.7   0.7  


 


Lipase     ()  U/L


 


Urine Color     (YELLOW)  


 


Urine Appearance     (CLEAR)  


 


Urine pH     (5.0-6.5)  


 


Ur Specific Gravity     (1.010-1.025)  


 


Urine Protein     (NEGATIVE)  mg/dL


 


Urine Glucose (UA)     (NORMAL)  mg/dL


 


Urine Ketones     (NEGATIVE)  mg/dL


 


Urine Occult Blood     (NEGATIVE)  


 


Urine Nitrite     (NEGATIVE)  


 


Urine Bilirubin     (NEGATIVE)  


 


Urine Urobilinogen     (NEGATIVE)  mg/dL


 


Ur Leukocyte Esterase     (NEGATIVE)  


 


Urine RBC     (0-5)  


 


Urine WBC     (0-5)  


 


Ur Squamous Epith Cells     (NS,R,O)  


 


Urine Bacteria     (NS)  


 


Urine Mucus     (NS)  


 


Ethyl Alcohol     (<0.03)  %


 


SARS-CoV-2 RNA (JOE)     (NEGATIVE)  











Result Diagrams: 


                                 05/27/21 12:12





                                 05/28/21 00:05





Sepsis Event Note





- Evaluation


Sepsis Screening Result: No Definite Risk





- Focused Exam


Vital Signs: 


                                   Vital Signs











  Temp Pulse Resp BP BP Pulse Ox


 


 05/28/21 08:43     164/120 H  


 


 05/28/21 08:41     164/120 H  


 


 05/28/21 04:00  98.2 F  106 H  20   156/100 H  96


 


 05/27/21 23:41  98.1 F  104 H  20   154/107 H  95














- Problem List & Annotations


(1) Acute pancreatitis


SNOMED Code(s): 147778634


   Code(s): K85.90 - ACUTE PANCREATITIS WITHOUT NECROSIS OR INFECTION, UNSP   

Status: Acute   Current Visit: Yes   


Qualifiers: 


   Pancreatitis type: alcohol induced   Acute pancreatitis complication: 

unspecified   Qualified Code(s): K85.20 - Alcohol induced acute pancreatitis 

without necrosis or infection   





(2) Alcohol withdrawal


SNOMED Code(s): 474811383


   Code(s): F10.239 - ALCOHOL DEPENDENCE WITH WITHDRAWAL, UNSPECIFIED   Status: 

Acute   Current Visit: Yes   


Qualifiers: 


   Complication of substance-induced condition: with perceptual disturbance   

Qualified Code(s): F10.232 - Alcohol dependence with withdrawal with perceptual 

disturbance   





(3) Alcohol abuse


SNOMED Code(s): 07639626


   Code(s): F10.10 - ALCOHOL ABUSE, UNCOMPLICATED   Status: Acute   Current 

Visit: Yes   





(4) Fatty liver


SNOMED Code(s): 424783641


   Code(s): K76.0 - FATTY (CHANGE OF) LIVER, NOT ELSEWHERE CLASSIFIED   Status: 

Acute   Current Visit: Yes   





(5) Phlegmon of pancreas


SNOMED Code(s): 65081939


   Code(s): K85.90 - ACUTE PANCREATITIS WITHOUT NECROSIS OR INFECTION, UNSP   

Status: Acute   Current Visit: Yes   





(6) Hypertension


SNOMED Code(s): 28424930


   Code(s): I10 - ESSENTIAL (PRIMARY) HYPERTENSION   Status: Chronic   Current 

Visit: Yes   


Qualifiers: 


   Hypertension type: unspecified   Qualified Code(s): I10 - Essential (primary)

hypertension   





- Problem List Review


Problem List Initiated/Reviewed/Updated: Yes





- My Orders


Last 24 Hours: 


My Active Orders





05/28/21 08:34


C-REACTIVE PROTEIN [CHEM] Routine 


LACTIC ACID [CHEM] Stat 





05/28/21 08:40


hydrOXYzine HCL [Vistaril]   50 mg IM Q6H PRN 





05/28/21 08:41


Abdomen 2V AP Flat Upright [CR] Urgent 





05/28/21 08:42


AMMONIA, PLASMA Urgent 





05/29/21 05:11


LIPASE [CHEM] AM 














- Plan


Plan:: 


We will continue with aggressive pain control, IV fluid replacement, IV 

benzodiazepines,and along with thiamine, anti-emetics. I will repeat labs i

ncluding a lipase level. Flat and upright x-ray will be done today, and possibly

 a CT repeat to make sure there is no progression of the acute pancreatitis.

## 2021-05-28 NOTE — CT
INDICATION:  Abdominal distension.



CT ABDOMEN AND PELVIS:  Spiral 3.75 mm axial sections were obtained through the 
abdomen and pelvis with 100 cc Isovue-370 at 1.5 cc/second with sagittal and 
coronal reconstructions 05/28/21 and compared with 05/27/21.



In the lower lung fields visualized, there is now noted multiple areas of 
density compatible with subsegmental atelectasis in the lower lobes and lingula.
 There also appear to be some abnormal airspaces posteriorly in the lower lobes.




The severity of involvement of the pancreas in pancreatitis has significantly 
increased with pancreatic phlegmon now extending into the pelvis with ascites.  
No definite pseudocyst is formed at this time.  



No other change or new acute process was identified compared with the previous 
day's CT. 



Fatty liver is again noted.  Minimal renal cortical scarring and low density 
lesions, likely tiny cysts again noted in the kidneys. 



IMPRESSION:  Increasing severity of pancreatitis with pancreatic phlegmon now 
visualized throughout the abdomen including pericolonic gutters and extending 
into the pelvis with ascites in the lower abdomen and pelvis.  



Total exam DLP was 1964.19 mGy-cm. 



Report was called to Dr. Cárdenas at 1037 hours 05/28/21. 

MTDD

## 2021-05-29 NOTE — DISCH
DISCHARGE DATE:  05/28/2021

 

REASON FOR ADMISSION:

1. Acute pancreatitis.

2. Alcohol abuse.

 

REASON FOR TRANSFER:

1. Severe pancreatitis.

2. Acute kidney injury.

 

BRIEF HISTORY:  A 30-year-old  male admitted after alcohol

consumption, thought to be withdrawals, had abdominal pain.  A CT revealed

pancreatitis.  Admitted, n.p.o., started fluids, thiamine replacement, but the

symptoms got worse with pain, which was uncontrollable and abdominal distention.

Ascites was noted on repeat CT and marked inflammation of the pancreas.  Lipase

went up to 3,300 and creatinine jumped to 1.6.  I discussed with the hospitalist

at [facility] and transferred the patient by ambulance.  I started empiric Zosyn,

bolus of fluid 2 L given before transfer.  I spent an hour in the transfer and

discharge of the patient.

 

Job#: [id number]

DD: 05/29/2021 [id number]

DT: 05/29/2021 1018 TN/LOUIS

## 2023-01-12 ENCOUNTER — HOSPITAL ENCOUNTER (EMERGENCY)
Dept: HOSPITAL 7 - FB.ED | Age: 33
Discharge: HOME | End: 2023-01-12
Payer: MEDICAID

## 2023-01-12 DIAGNOSIS — R55: Primary | ICD-10-CM

## 2023-01-12 DIAGNOSIS — Z79.899: ICD-10-CM

## 2023-01-12 DIAGNOSIS — E66.9: ICD-10-CM
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
## 2023-01-12 DIAGNOSIS — I10: ICD-10-CM

## 2023-01-12 DIAGNOSIS — Z91.018: ICD-10-CM

## 2023-01-12 DIAGNOSIS — F17.210: ICD-10-CM

## 2023-01-12 DIAGNOSIS — F10.129: ICD-10-CM

## 2023-07-19 ENCOUNTER — HOSPITAL ENCOUNTER (EMERGENCY)
Dept: HOSPITAL 7 - FB.ED | Age: 33
Discharge: NURSING, REHAB OR LONG-TERM CARE | End: 2023-07-19
Payer: MEDICAID

## 2023-07-19 DIAGNOSIS — N17.9: ICD-10-CM

## 2023-07-19 DIAGNOSIS — E11.10: Primary | ICD-10-CM

## 2023-07-19 DIAGNOSIS — Z79.4: ICD-10-CM

## 2023-07-19 DIAGNOSIS — E66.9: ICD-10-CM

## 2023-07-19 DIAGNOSIS — Z91.018: ICD-10-CM

## 2023-07-19 DIAGNOSIS — J45.909: ICD-10-CM

## 2023-07-19 DIAGNOSIS — I10: ICD-10-CM

## 2023-07-19 DIAGNOSIS — Z72.0: ICD-10-CM

## 2023-07-19 DIAGNOSIS — Z79.899: ICD-10-CM

## 2023-07-19 LAB
ALBUMIN SERPL-MCNC: 4.7 G/DL (ref 3.5–5.2)
ALBUMIN/GLOB SERPL: 0.8 {RATIO}
ALP SERPL-CCNC: 151 IU/L (ref 56–112)
ALT SERPL-CCNC: 68 U/L (ref 12–36)
APPEARANCE UR: CLEAR
AST SERPL-CCNC: 39 IU/L (ref 5–25)
BACTERIA URNS QL MICRO: (no result)
BASE EXCESS BLDV CALC-SCNC: -16 MMOL/L
BASOPHILS NFR BLD MANUAL: 1 % (ref 0–2)
BILIRUB SERPL-MCNC: 0.7 MG/DL (ref 0.1–1.3)
BILIRUB UR STRIP-MCNC: NEGATIVE MG/DL
BUN SERPL-MCNC: 24 MG/DL (ref 7–18)
BUN/CREAT SERPL: 10 (ref 9–20)
CALCIUM SERPL-MCNC: 9.7 MG/DL (ref 8.6–10.2)
CHLORIDE SERPL-SCNC: 77 MMOL/L (ref 100–110)
CO2 SERPL-SCNC: 9 MMOL/L (ref 21–32)
COLOR UR: YELLOW
CREAT CL 24H UR+SERPL-VRATE: (no result) ML/MIN
CREAT SERPL-MCNC: 2.4 MG/DL (ref 0.7–1.3)
CRP SERPL-MCNC: 1.67 MG/DL (ref ?–0.33)
ERYTHROCYTE [DISTWIDTH] IN BLOOD BY AUTOMATED COUNT: 13.5 % (ref 12.4–15)
ETHANOL BLD-MCNC: < 0.03 % (ref ?–0.03)
GLOBULIN SER-MCNC: 5.9 G/DL
GLUCOSE SERPL-MCNC: 859 MG/DL (ref 80–116)
GLUCOSE UR STRIP-MCNC: >1000 MG/DL
HCT VFR BLD AUTO: 53.2 % (ref 38.3–50.1)
HGB BLD-MCNC: 17.2 G/DL (ref 12.9–17.7)
KETONES UR STRIP-MCNC: 150 MG/DL
LIPASE SERPL-CCNC: 24 U/L (ref 16–77)
LYMPHOCYTES NFR BLD MANUAL: 11 % (ref 13–37)
MAGNESIUM SERPL-MCNC: 2.3 MG/DL (ref 1.8–2.5)
MCH RBC QN AUTO: 30 PG (ref 27–33.3)
MCHC RBC AUTO-ENTMCNC: 32.3 G/DL (ref 28.7–35.3)
MCHC RBC AUTO-ENTMCNC: 92.8 FL (ref 80.8–98.7)
MONOCYTES NFR BLD MANUAL: 6 % (ref 4–12)
NEUTROPHILS NFR BLD MANUAL: 82 % (ref 46–82)
NITRITE UR QL: NEGATIVE
PCO2 BLDV: 19 MMHG (ref 41–51)
PH BLDV: 7.24 PH UNITS (ref 7.32–7.43)
PH UR STRIP: 5 [PH] (ref 5–6.5)
PLATELET # BLD AUTO: 334 X10(3)UL (ref 117–477)
PMV BLD AUTO: 10.7 FL (ref 6.7–11)
POTASSIUM SERPL-SCNC: 5.8 MMOL/L (ref 3.5–5.3)
PROT SERPL-MCNC: 10.6 G/DL (ref 6–8)
PROT UR STRIP-MCNC: (no result) MG/DL
RBC # BLD AUTO: 5.73 X10(6)UL (ref 3.9–5.9)
RBC # URNS HPF: (no result) /ML (ref 0–5)
RBC UR QL: NEGATIVE
SODIUM SERPL-SCNC: 124 MMOL/L (ref 135–145)
SP GR UR STRIP: 1.01 (ref 1.01–1.02)
SQUAMOUS #/AREA URNS HPF: (no result) /[HPF]
TROPONIN I SERPL-MCNC: 6.3 PG/ML (ref 4–60.3)
UROBILINOGEN UR STRIP-ACNC: NORMAL MG/DL
WBC # BLD AUTO: 16.2 X10-3/UL (ref 3.2–10.1)
WBC UR QL: (no result) (ref 0–5)

## 2023-07-19 PROCEDURE — 84484 ASSAY OF TROPONIN QUANT: CPT

## 2023-07-19 PROCEDURE — 82947 ASSAY GLUCOSE BLOOD QUANT: CPT

## 2023-07-19 PROCEDURE — 83690 ASSAY OF LIPASE: CPT

## 2023-07-19 PROCEDURE — 85025 COMPLETE CBC W/AUTO DIFF WBC: CPT

## 2023-07-19 PROCEDURE — 81001 URINALYSIS AUTO W/SCOPE: CPT

## 2023-07-19 PROCEDURE — 36415 COLL VENOUS BLD VENIPUNCTURE: CPT

## 2023-07-19 PROCEDURE — 83735 ASSAY OF MAGNESIUM: CPT

## 2023-07-19 PROCEDURE — 80307 DRUG TEST PRSMV CHEM ANLYZR: CPT

## 2023-07-19 PROCEDURE — 86140 C-REACTIVE PROTEIN: CPT

## 2023-07-19 PROCEDURE — 83605 ASSAY OF LACTIC ACID: CPT

## 2023-07-19 PROCEDURE — C9113 INJ PANTOPRAZOLE SODIUM, VIA: HCPCS

## 2023-07-19 PROCEDURE — 80053 COMPREHEN METABOLIC PANEL: CPT

## 2023-07-19 PROCEDURE — 96361 HYDRATE IV INFUSION ADD-ON: CPT

## 2023-07-19 PROCEDURE — 93005 ELECTROCARDIOGRAM TRACING: CPT

## 2023-07-19 PROCEDURE — 93010 ELECTROCARDIOGRAM REPORT: CPT

## 2023-07-19 PROCEDURE — 96374 THER/PROPH/DIAG INJ IV PUSH: CPT

## 2023-07-19 PROCEDURE — 96375 TX/PRO/DX INJ NEW DRUG ADDON: CPT

## 2023-07-19 PROCEDURE — 99285 EMERGENCY DEPT VISIT HI MDM: CPT

## 2025-02-14 ENCOUNTER — HOSPITAL ENCOUNTER (EMERGENCY)
Dept: HOSPITAL 7 - FB.ED | Age: 35
Discharge: LEFT BEFORE DISCHARGE | End: 2025-02-14
Payer: MEDICAID

## 2025-02-14 VITALS — SYSTOLIC BLOOD PRESSURE: 136 MMHG | DIASTOLIC BLOOD PRESSURE: 102 MMHG

## 2025-02-14 VITALS — HEART RATE: 129 BPM

## 2025-02-14 DIAGNOSIS — J45.909: ICD-10-CM

## 2025-02-14 DIAGNOSIS — I10: ICD-10-CM

## 2025-02-14 DIAGNOSIS — E87.1: ICD-10-CM

## 2025-02-14 DIAGNOSIS — Z91.018: ICD-10-CM

## 2025-02-14 DIAGNOSIS — Z79.899: ICD-10-CM

## 2025-02-14 DIAGNOSIS — E11.10: Primary | ICD-10-CM

## 2025-02-14 DIAGNOSIS — E86.0: ICD-10-CM

## 2025-02-14 DIAGNOSIS — E66.9: ICD-10-CM

## 2025-02-14 DIAGNOSIS — Z79.4: ICD-10-CM

## 2025-02-14 DIAGNOSIS — N17.9: ICD-10-CM

## 2025-02-14 DIAGNOSIS — Z79.51: ICD-10-CM

## 2025-02-14 LAB
ALBUMIN SERPL-MCNC: 4.4 G/DL (ref 3.5–5.2)
ALBUMIN/GLOB SERPL: 0.7 {RATIO}
ALP SERPL-CCNC: 125 IU/L (ref 56–112)
ALT SERPL-CCNC: 57 U/L (ref 12–36)
APPEARANCE UR: CLEAR
AST SERPL-CCNC: 49 IU/L (ref 5–25)
BACTERIA URNS QL MICRO: (no result)
BASE EXCESS BLDV CALC-SCNC: -17 MMOL/L
BILIRUB SERPL-MCNC: 0.8 MG/DL (ref 0.1–1.3)
BILIRUB UR STRIP-MCNC: NEGATIVE MG/DL
BUN SERPL-MCNC: 15 MG/DL (ref 7–18)
BUN SERPL-MCNC: 17 MG/DL (ref 7–18)
BUN/CREAT SERPL: 18 (ref 9–20)
BUN/CREAT SERPL: 8.8 (ref 9–20)
CALCIUM SERPL-MCNC: 8.2 MG/DL (ref 8.6–10.2)
CALCIUM SERPL-MCNC: 9.9 MG/DL (ref 8.6–10.2)
CHLORIDE SERPL-SCNC: 87 MMOL/L (ref 100–110)
CHLORIDE SERPL-SCNC: 92 MMOL/L (ref 100–110)
CO2 SERPL-SCNC: 10 MMOL/L (ref 21–32)
CO2 SERPL-SCNC: 15 MMOL/L (ref 21–32)
COLOR UR: YELLOW
COMMENT: (no result)
CREAT CL 24H UR+SERPL-VRATE: 46.78 ML/MIN
CREAT CL 24H UR+SERPL-VRATE: 57.78 ML/MIN
CREAT SERPL-MCNC: 1.7 MG/DL (ref 0.7–1.3)
CREAT SERPL-MCNC: 2.1 MG/DL (ref 0.7–1.3)
ERYTHROCYTE [DISTWIDTH] IN BLOOD BY AUTOMATED COUNT: 13.1 % (ref 12.4–15)
GLOBULIN SER-MCNC: 6 G/DL
GLUCOSE SERPL-MCNC: 321 MG/DL (ref 80–116)
GLUCOSE SERPL-MCNC: 501 MG/DL (ref 80–116)
GLUCOSE UR STRIP-MCNC: >1000 MG/DL
HCT VFR BLD AUTO: 58.2 % (ref 38.3–50.1)
HGB BLD-MCNC: 19.7 G/DL (ref 12.9–17.7)
KETONES UR STRIP-MCNC: 150 MG/DL
LYMPHOCYTES NFR BLD MANUAL: 27 % (ref 13–37)
MCH RBC QN AUTO: 31.2 PG (ref 27–33.3)
MCHC RBC AUTO-ENTMCNC: 33.9 G/DL (ref 28.7–35.3)
MCHC RBC AUTO-ENTMCNC: 92.2 FL (ref 80.8–98.7)
MONOCYTES NFR BLD MANUAL: 11 % (ref 4–12)
NEUTROPHILS NFR BLD MANUAL: 62 % (ref 46–82)
NITRITE UR QL: NEGATIVE
PCO2 BLDV: 29 MMHG (ref 41–51)
PH BLDV: 7.16 PH UNITS (ref 7.32–7.43)
PH UR STRIP: 5 [PH] (ref 5–6.5)
PLATELET # BLD AUTO: 224 X10(3)UL (ref 117–477)
PMV BLD AUTO: 10.7 FL (ref 6.7–11)
POTASSIUM SERPL-SCNC: 4.1 MMOL/L (ref 3.5–5.3)
POTASSIUM SERPL-SCNC: 4.4 MMOL/L (ref 3.5–5.3)
PROT SERPL-MCNC: 10.4 G/DL (ref 6–8)
PROT UR STRIP-MCNC: (no result) MG/DL
RBC # BLD AUTO: 6.31 X10(6)UL (ref 3.9–5.9)
RBC UR QL: NEGATIVE
SODIUM SERPL-SCNC: 126 MMOL/L (ref 135–145)
SODIUM SERPL-SCNC: 130 MMOL/L (ref 135–145)
SP GR UR STRIP: 1.03 (ref 1.01–1.02)
SQUAMOUS #/AREA URNS HPF: (no result) /[HPF]
UROBILINOGEN UR STRIP-ACNC: NORMAL MG/DL
WBC # BLD AUTO: 9.1 X10-3/UL (ref 3.2–10.1)

## 2025-02-14 RX ADMIN — ONDANSETRON ONE MG: 2 INJECTION, SOLUTION INTRAMUSCULAR; INTRAVENOUS at 07:47

## 2025-02-14 RX ADMIN — KETOROLAC TROMETHAMINE ONE MG: 30 INJECTION, SOLUTION INTRAMUSCULAR at 04:38

## 2025-02-14 RX ADMIN — ONDANSETRON ONE MG: 2 INJECTION, SOLUTION INTRAMUSCULAR; INTRAVENOUS at 04:39

## 2025-02-14 RX ADMIN — Medication PRN ML: at 04:39

## 2025-02-14 RX ADMIN — INSULIN GLARGINE STA UNITS: 100 INJECTION, SOLUTION SUBCUTANEOUS at 08:07

## 2025-02-14 RX ADMIN — INSULIN LISPRO STA UNIT: 100 INJECTION, SOLUTION INTRAVENOUS; SUBCUTANEOUS at 08:06
